# Patient Record
Sex: MALE | Race: WHITE | NOT HISPANIC OR LATINO | Employment: FULL TIME | RURAL
[De-identification: names, ages, dates, MRNs, and addresses within clinical notes are randomized per-mention and may not be internally consistent; named-entity substitution may affect disease eponyms.]

---

## 2021-12-01 ENCOUNTER — TELEPHONE (OUTPATIENT)
Dept: PRIMARY CARE CLINIC | Facility: CLINIC | Age: 66
End: 2021-12-01
Payer: MEDICARE

## 2021-12-01 RX ORDER — AMLODIPINE BESYLATE 5 MG/1
5 TABLET ORAL DAILY
Qty: 90 TABLET | Refills: 0 | Status: SHIPPED | OUTPATIENT
Start: 2021-12-01 | End: 2021-12-01

## 2021-12-01 RX ORDER — AMLODIPINE BESYLATE 5 MG/1
5 TABLET ORAL DAILY
COMMUNITY
End: 2021-12-01 | Stop reason: SDUPTHER

## 2021-12-01 RX ORDER — AMLODIPINE BESYLATE 5 MG/1
TABLET ORAL
Qty: 90 TABLET | Refills: 3 | Status: SHIPPED | OUTPATIENT
Start: 2021-12-01 | End: 2022-02-15 | Stop reason: SDUPTHER

## 2022-02-15 ENCOUNTER — OFFICE VISIT (OUTPATIENT)
Dept: PRIMARY CARE CLINIC | Facility: CLINIC | Age: 67
End: 2022-02-15
Payer: MEDICARE

## 2022-02-15 VITALS
DIASTOLIC BLOOD PRESSURE: 86 MMHG | WEIGHT: 236 LBS | RESPIRATION RATE: 16 BRPM | BODY MASS INDEX: 29.34 KG/M2 | HEART RATE: 61 BPM | SYSTOLIC BLOOD PRESSURE: 144 MMHG | OXYGEN SATURATION: 98 % | HEIGHT: 75 IN | TEMPERATURE: 98 F

## 2022-02-15 DIAGNOSIS — Z12.5 SCREENING FOR MALIGNANT NEOPLASM OF PROSTATE: ICD-10-CM

## 2022-02-15 DIAGNOSIS — I10 ESSENTIAL HYPERTENSION: Primary | ICD-10-CM

## 2022-02-15 DIAGNOSIS — Z12.11 SCREENING FOR MALIGNANT NEOPLASM OF COLON: ICD-10-CM

## 2022-02-15 PROCEDURE — 99204 OFFICE O/P NEW MOD 45 MIN: CPT | Mod: ,,, | Performed by: NURSE PRACTITIONER

## 2022-02-15 PROCEDURE — 99204 PR OFFICE/OUTPT VISIT, NEW, LEVL IV, 45-59 MIN: ICD-10-PCS | Mod: ,,, | Performed by: NURSE PRACTITIONER

## 2022-02-15 RX ORDER — AMLODIPINE BESYLATE 5 MG/1
TABLET ORAL
Qty: 90 TABLET | Refills: 3 | Status: SHIPPED | OUTPATIENT
Start: 2022-02-15 | End: 2022-10-06 | Stop reason: SDUPTHER

## 2022-02-16 PROBLEM — I10 ESSENTIAL HYPERTENSION: Status: ACTIVE | Noted: 2022-02-16

## 2022-02-16 NOTE — PROGRESS NOTES
Subjective:       Patient ID: Shahzad Alarcon is a 66 y.o. male.    Chief Complaint: Follow-up (1 year) and Medication Refill    HPI Shahzad Alarcon presents today for routine follow up and medication refill  Patient denies complaints or problems at present    Review of Systems   Constitutional: Negative for fatigue, fever and unexpected weight change.   HENT: Negative for nasal congestion, postnasal drip and sore throat.    Eyes: Negative for pain and redness.   Respiratory: Negative for cough, shortness of breath and wheezing.    Cardiovascular: Negative for chest pain and leg swelling.   Gastrointestinal: Negative for abdominal pain, constipation, diarrhea and nausea.   Genitourinary: Negative for dysuria and hematuria.   Musculoskeletal: Negative for gait problem.   Integumentary:  Negative for color change and rash.   Neurological: Negative for vertigo, syncope and headaches.         Objective:      Physical Exam  Constitutional:       General: He is not in acute distress.     Appearance: Normal appearance. He is not ill-appearing.   HENT:      Head: Normocephalic.   Eyes:      Conjunctiva/sclera: Conjunctivae normal.      Pupils: Pupils are equal, round, and reactive to light.   Cardiovascular:      Rate and Rhythm: Normal rate and regular rhythm.      Heart sounds: No murmur heard.      Pulmonary:      Effort: Pulmonary effort is normal. No respiratory distress.      Breath sounds: Normal breath sounds. No wheezing, rhonchi or rales.   Abdominal:      General: Bowel sounds are normal.      Palpations: Abdomen is soft.      Tenderness: There is no abdominal tenderness.   Musculoskeletal:         General: No swelling. Normal range of motion.      Cervical back: Neck supple. No rigidity or tenderness.      Right lower leg: No edema.      Left lower leg: No edema.   Skin:     General: Skin is warm and dry.   Neurological:      General: No focal deficit present.      Mental Status: He is alert and oriented to  person, place, and time.      Cranial Nerves: No cranial nerve deficit.         Assessment:       Problem List Items Addressed This Visit        Cardiac/Vascular    Essential hypertension - Primary    Relevant Medications    amLODIPine (NORVASC) 5 MG tablet    Other Relevant Orders    Lipid Panel (Completed)    Comprehensive Metabolic Panel (Completed)    CBC Auto Differential (Completed)      Other Visit Diagnoses     Screening for malignant neoplasm of prostate        Relevant Orders    PSA, Screening (Completed)    Screening for malignant neoplasm of colon        Relevant Orders    Cologuard Screening (Multitarget Stool DNA)          Plan:       Labs as ordered  Refills  Cologuard order  RTC 1 year, sooner as needed

## 2022-03-07 LAB — NONINV COLON CA DNA+OCC BLD SCRN STL QL: NEGATIVE

## 2022-10-06 DIAGNOSIS — I10 ESSENTIAL HYPERTENSION: ICD-10-CM

## 2022-10-06 RX ORDER — AMLODIPINE BESYLATE 5 MG/1
TABLET ORAL
Qty: 90 TABLET | Refills: 3 | Status: SHIPPED | OUTPATIENT
Start: 2022-10-06 | End: 2023-09-26 | Stop reason: SDUPTHER

## 2023-01-10 ENCOUNTER — OFFICE VISIT (OUTPATIENT)
Dept: OTOLARYNGOLOGY | Facility: CLINIC | Age: 68
End: 2023-01-10
Payer: MEDICARE

## 2023-01-10 VITALS — BODY MASS INDEX: 29.34 KG/M2 | HEIGHT: 75 IN | WEIGHT: 236 LBS

## 2023-01-10 DIAGNOSIS — H81.10 BENIGN PAROXYSMAL POSITIONAL VERTIGO, UNSPECIFIED LATERALITY: ICD-10-CM

## 2023-01-10 DIAGNOSIS — R42 VERTIGO: Primary | ICD-10-CM

## 2023-01-10 PROCEDURE — 99213 OFFICE O/P EST LOW 20 MIN: CPT | Mod: PBBFAC | Performed by: OTOLARYNGOLOGY

## 2023-01-10 PROCEDURE — 99204 OFFICE O/P NEW MOD 45 MIN: CPT | Mod: S$PBB,,, | Performed by: OTOLARYNGOLOGY

## 2023-01-10 PROCEDURE — 99204 PR OFFICE/OUTPT VISIT, NEW, LEVL IV, 45-59 MIN: ICD-10-PCS | Mod: S$PBB,,, | Performed by: OTOLARYNGOLOGY

## 2023-01-10 NOTE — PROGRESS NOTES
Subjective:       Patient ID: Shahzad Alarcon is a 67 y.o. male.    Chief Complaint: Dizziness (Vertigo.)    HPI  Review of Systems   Constitutional:  Negative for chills, fatigue and fever.   HENT:  Negative for nasal congestion, ear discharge, ear pain, sinus pressure/congestion and tinnitus.    Neurological:  Positive for dizziness and vertigo.       Objective:      Physical Exam  Constitutional:       Appearance: Normal appearance.   HENT:      Head: Normocephalic.      Right Ear: Tympanic membrane, ear canal and external ear normal.      Left Ear: Tympanic membrane, ear canal and external ear normal.      Nose: Nose normal.      Mouth/Throat:      Mouth: Mucous membranes are moist.      Pharynx: Oropharynx is clear.   Eyes:      General: Lids are normal.      Pupils: Pupils are equal, round, and reactive to light.   Pulmonary:      Effort: Pulmonary effort is normal.   Musculoskeletal:         General: Normal range of motion.      Cervical back: Normal range of motion.   Skin:     General: Skin is warm and dry.   Neurological:      Mental Status: He is alert and oriented to person, place, and time.      Motor: No weakness.      Coordination: Coordination normal.      Gait: Gait normal.   Psychiatric:         Mood and Affect: Mood normal.         Behavior: Behavior is cooperative.       Assessment:       Problem List Items Addressed This Visit    None  Visit Diagnoses       Vertigo    -  Primary    Benign paroxysmal positional vertigo, unspecified laterality                Plan:   [facility] for vestibular therapy  Follow up as needed.

## 2023-09-26 ENCOUNTER — OFFICE VISIT (OUTPATIENT)
Dept: PRIMARY CARE CLINIC | Facility: CLINIC | Age: 68
End: 2023-09-26
Payer: MEDICARE

## 2023-09-26 VITALS
DIASTOLIC BLOOD PRESSURE: 86 MMHG | TEMPERATURE: 98 F | OXYGEN SATURATION: 98 % | BODY MASS INDEX: 28.85 KG/M2 | SYSTOLIC BLOOD PRESSURE: 134 MMHG | WEIGHT: 232 LBS | HEART RATE: 82 BPM | HEIGHT: 75 IN

## 2023-09-26 DIAGNOSIS — Z11.59 NEED FOR HEPATITIS C SCREENING TEST: ICD-10-CM

## 2023-09-26 DIAGNOSIS — I10 ESSENTIAL HYPERTENSION: Primary | ICD-10-CM

## 2023-09-26 DIAGNOSIS — Z12.5 SCREENING FOR MALIGNANT NEOPLASM OF PROSTATE: ICD-10-CM

## 2023-09-26 PROCEDURE — 99214 OFFICE O/P EST MOD 30 MIN: CPT | Mod: ,,, | Performed by: NURSE PRACTITIONER

## 2023-09-26 PROCEDURE — 99214 PR OFFICE/OUTPT VISIT, EST, LEVL IV, 30-39 MIN: ICD-10-PCS | Mod: ,,, | Performed by: NURSE PRACTITIONER

## 2023-09-26 RX ORDER — AMLODIPINE BESYLATE 5 MG/1
5 TABLET ORAL DAILY
Qty: 90 TABLET | Refills: 3 | Status: SHIPPED | OUTPATIENT
Start: 2023-09-26

## 2023-09-26 NOTE — PROGRESS NOTES
Subjective     Patient ID: Shahzad Alarcon is a 67 y.o. male.    Chief Complaint: Establish Care    Pt presents to establish care.       Review of Systems   Constitutional:  Negative for activity change, appetite change, fatigue and fever.   HENT:  Negative for nasal congestion, nosebleeds, postnasal drip, rhinorrhea, sinus pressure/congestion, sneezing and sore throat.    Eyes:  Negative for pain and itching.   Respiratory:  Negative for cough, chest tightness, shortness of breath, wheezing and stridor.    Cardiovascular:  Negative for chest pain.   Gastrointestinal:  Negative for abdominal pain.   Genitourinary:  Negative for dysuria.   Musculoskeletal:  Negative for back pain.   Neurological:  Negative for dizziness and headaches.   Psychiatric/Behavioral:  Negative for behavioral problems and confusion.           Objective     Physical Exam  Vitals and nursing note reviewed.   Constitutional:       Appearance: Normal appearance.   Cardiovascular:      Rate and Rhythm: Normal rate and regular rhythm.      Heart sounds: Normal heart sounds.   Pulmonary:      Effort: Pulmonary effort is normal.      Breath sounds: Normal breath sounds.   Musculoskeletal:         General: Normal range of motion.   Neurological:      Mental Status: He is alert and oriented to person, place, and time.   Psychiatric:         Mood and Affect: Mood normal.         Behavior: Behavior normal.            Assessment and Plan     1. Essential hypertension  -     CBC Auto Differential; Future; Expected date: 09/26/2023  -     Comprehensive Metabolic Panel; Future; Expected date: 09/26/2023  -     Lipid Panel; Future; Expected date: 09/26/2023  -     TSH; Future; Expected date: 09/26/2023  -     amLODIPine (NORVASC) 5 MG tablet; Take 1 tablet (5 mg total) by mouth once daily.  Dispense: 90 tablet; Refill: 3    2. Screening for malignant neoplasm of prostate  -     PSA, Screening; Future; Expected date: 09/26/2023    3. Need for hepatitis C  screening test  -     Hepatitis C Antibody; Future; Expected date: 09/26/2023        Will call pt with lab results. Pt declined a pneumonia vaccine.          Follow up in about 6 months (around 3/26/2024).

## 2023-09-27 DIAGNOSIS — R97.20 ELEVATED PSA: Primary | ICD-10-CM

## 2023-12-07 ENCOUNTER — OFFICE VISIT (OUTPATIENT)
Dept: UROLOGY | Facility: CLINIC | Age: 68
End: 2023-12-07
Payer: MEDICARE

## 2023-12-07 VITALS
OXYGEN SATURATION: 97 % | BODY MASS INDEX: 30.16 KG/M2 | TEMPERATURE: 98 F | DIASTOLIC BLOOD PRESSURE: 88 MMHG | HEART RATE: 64 BPM | RESPIRATION RATE: 18 BRPM | WEIGHT: 235 LBS | SYSTOLIC BLOOD PRESSURE: 138 MMHG | HEIGHT: 74 IN

## 2023-12-07 DIAGNOSIS — R97.20 ELEVATED PSA: Primary | ICD-10-CM

## 2023-12-07 DIAGNOSIS — R35.1 NOCTURIA: ICD-10-CM

## 2023-12-07 PROCEDURE — 99215 OFFICE O/P EST HI 40 MIN: CPT | Mod: PBBFAC | Performed by: NURSE PRACTITIONER

## 2023-12-07 PROCEDURE — 99214 OFFICE O/P EST MOD 30 MIN: CPT | Mod: S$PBB,,, | Performed by: NURSE PRACTITIONER

## 2023-12-07 PROCEDURE — 87086 URINE CULTURE/COLONY COUNT: CPT | Mod: ,,, | Performed by: CLINICAL MEDICAL LABORATORY

## 2023-12-07 PROCEDURE — 99214 PR OFFICE/OUTPT VISIT, EST, LEVL IV, 30-39 MIN: ICD-10-PCS | Mod: S$PBB,,, | Performed by: NURSE PRACTITIONER

## 2023-12-07 PROCEDURE — 87086 CULTURE, URINE: ICD-10-PCS | Mod: ,,, | Performed by: CLINICAL MEDICAL LABORATORY

## 2023-12-07 RX ORDER — SULFAMETHOXAZOLE AND TRIMETHOPRIM 800; 160 MG/1; MG/1
TABLET ORAL
Qty: 60 TABLET | Refills: 0 | Status: SHIPPED | OUTPATIENT
Start: 2023-12-07

## 2023-12-07 NOTE — PATIENT INSTRUCTIONS
Urine culture   Rx Bactrim DS take one tablet twice a day, avoid direct sunlight, discussed side effects to include a rash  PSA in 4 months  IF PSA is back to normal then recheck the PSA in 6 months IF the PSA is still elevated then MRI of the prostate versus prostate biopsies in the clinic   Follow up with Urology [facility] Burch in 4 months or sooner if needed

## 2023-12-09 LAB — UA COMPLETE W REFLEX CULTURE PNL UR: NO GROWTH

## 2023-12-11 ENCOUNTER — TELEPHONE (OUTPATIENT)
Dept: UROLOGY | Facility: CLINIC | Age: 68
End: 2023-12-11
Payer: MEDICARE

## 2023-12-11 NOTE — TELEPHONE ENCOUNTER
----- Message from Valdez Berman NP sent at 12/10/2023  6:01 PM CST -----  Please let patient know his urine culture was negative, thanks   I called and spoke with the pt and relayed the above message.  He voiced understanding.

## 2024-04-03 ENCOUNTER — TELEPHONE (OUTPATIENT)
Dept: UROLOGY | Facility: CLINIC | Age: 69
End: 2024-04-03
Payer: MEDICARE

## 2024-04-03 NOTE — TELEPHONE ENCOUNTER
----- Message from Valdez Berman NP sent at 4/3/2024 11:57 AM CDT -----  Please notify patient that his PSA went down slightly to 6.580 and this is still elevated.  I will discuss further recommendations at his Visit on April 8, 2024 at 0800 am, remind him to keep this appointment, thanks   I called the pt and got no answer.  Left voice message that I called from urology with lab results and will call back later this evening after clinic.

## 2024-04-03 NOTE — TELEPHONE ENCOUNTER
----- Message from Valdez Berman NP sent at 4/3/2024 11:57 AM CDT -----  Please notify patient that his PSA went down slightly to 6.580 and this is still elevated.  I will discuss further recommendations at his Visit on April 8, 2024 at 0800 am, remind him to keep this appointment, thanks   Pt called me back and I informed him of the above message.  He voiced understanding and said he will be here on Monday.

## 2024-04-06 NOTE — PROGRESS NOTES
Subjective     Patient ID: Shahzad Alarcon is a 68 y.o. male.    Chief Complaint: No chief complaint on file.    This pleasant 67 year old male presents to the clinic with his wife as a new patient referral from [facility] for elevated PSA.  Patient's PSA on September 26, 2023 was elevated at 6.750. I discussed with the patient that he has had two elevated PSA's and the risk for prostate cancer. I discussed that we can treat with a month of antibiotics and repeat the PSA in  4 months or we could do the MRI of the prostate with possible fusion biopsies or we could do prostate biopsies in the clinic. He desires to do the antibiotics for a month and repeat the PSA in 4 months. He denies any allergies to sulfa and we will treat with Bactrim DS for a month as outlined in the plan. I discussed the side effects of this medication to include a rash. They were encouraged to read up on the side effects. He denies any family history of prostate cancer. He is pleased with the way he voids. His IPSS score is 3 that reflects a weak stream and nocturia 1 time a night. His PVR is 0 mls. He denies dysuria, hematuria, incomplete bladder emptying, frequency, intermittency, urgency or straining. He denies fever, chills, nausea or vomiting. He denies any abdominal, back or bladder pain. I discussed the plan in detail with the patient and wife and they are in agreement with the plan. All their questions were answered at today's visit.      PSA History:   PSA was 6.750 on 09/26/2023  PSA was 5.690 on 02/15/2022  -------------------------------------------------------------------------------------------------------------------------------- [December 7, 2023].            This pleasant 68 year old male presents to the clinic with his wife for follow up of elevated PSA.  Patient states he is doing well today.  Patient's PSA on September 26, 2023 was elevated at 6.750 and elevated at 5.690 on February 15, 2022.  He denies any family history  of prostate cancer. We treated with a month of Bactrim DS one bid.  However, the patient states he only received a two week supply of the bactrim.  His repeat PSA was still elevated at 6.580 on April 2, 2024. We discussed doing the MRI to further evaluate the prostate and they are agreeable. I will see him back int he clinic the day after the MRI to discuss the results and make further recommendations.  He is pleased with the way he voids. His IPSS score is a 1 that reflects nocturia 1 time a night. His PVR is 0 mls. He denies dysuria, hematuria, incomplete bladder emptying, frequency, intermittency, urgency weak stream or straining. He denies fever, chills, nausea or vomiting. He denies any abdominal, back or bladder pain. I discussed the plan in detail with the patient and wife and they are in agreement with the plan. All their questions were answered at today's visit.       PSA History:   PSA was 6.580 on 04/02/2024  PSA was 6.750 on 09/26/2023  PSA was 5.690 on 02/15/2022  ------------------------------------------------------------------------------------------------------------------------------ [April 8, 2024].           Review of Systems   Constitutional:  Negative for activity change and fever.   HENT:  Negative for hearing loss and trouble swallowing.    Eyes:  Negative for visual disturbance.   Respiratory:  Negative for cough, shortness of breath and wheezing.    Cardiovascular:  Negative for chest pain.   Gastrointestinal:  Negative for abdominal pain, diarrhea, nausea and vomiting.   Endocrine: Negative for polyuria.   Genitourinary:  Negative for bladder incontinence, decreased urine volume, difficulty urinating, discharge, dysuria, enuresis, erectile dysfunction, flank pain, frequency, genital sores, hematuria, penile pain, penile swelling, scrotal swelling, testicular pain and urgency.        Elevated PSA        Nocturia    Musculoskeletal:  Negative for back pain and gait problem.   Integumentary:   Negative for rash.   Neurological:  Negative for speech difficulty and weakness.   Psychiatric/Behavioral:  Negative for behavioral problems and confusion.           Objective     Physical Exam  Vitals and nursing note reviewed.   Constitutional:       General: He is not in acute distress.     Appearance: Normal appearance. He is not ill-appearing, toxic-appearing or diaphoretic.   HENT:      Head: Normocephalic.   Eyes:      Extraocular Movements: Extraocular movements intact.   Cardiovascular:      Rate and Rhythm: Normal rate and regular rhythm.      Heart sounds: Normal heart sounds.   Pulmonary:      Effort: Pulmonary effort is normal. No respiratory distress.      Breath sounds: Normal breath sounds. No wheezing, rhonchi or rales.   Abdominal:      General: Bowel sounds are normal.      Palpations: Abdomen is soft.      Tenderness: There is no abdominal tenderness. There is no right CVA tenderness, left CVA tenderness, guarding or rebound.   Musculoskeletal:         General: Normal range of motion.      Cervical back: Normal range of motion. No rigidity.   Skin:     General: Skin is warm and dry.   Neurological:      General: No focal deficit present.      Mental Status: He is alert and oriented to person, place, and time.      Motor: No weakness.      Coordination: Coordination normal.      Gait: Gait normal.   Psychiatric:         Mood and Affect: Mood normal.         Behavior: Behavior normal.         Thought Content: Thought content normal.          Assessment and Plan     Problem List Items Addressed This Visit          Renal/    Elevated PSA - Primary    Relevant Orders    Creatinine, Serum    MRI Prostate W W/O Contrast        MRI of the Prostate with and without contrast to evaluate elevated PSA's   Creatinine level   Follow up with Urology NP GAIL Burch day after MRI

## 2024-04-08 ENCOUNTER — OFFICE VISIT (OUTPATIENT)
Dept: UROLOGY | Facility: CLINIC | Age: 69
End: 2024-04-08
Payer: MEDICARE

## 2024-04-08 VITALS
TEMPERATURE: 98 F | RESPIRATION RATE: 18 BRPM | DIASTOLIC BLOOD PRESSURE: 86 MMHG | HEART RATE: 65 BPM | SYSTOLIC BLOOD PRESSURE: 169 MMHG

## 2024-04-08 DIAGNOSIS — R97.20 ELEVATED PSA: Primary | ICD-10-CM

## 2024-04-08 PROCEDURE — 99213 OFFICE O/P EST LOW 20 MIN: CPT | Mod: PBBFAC | Performed by: NURSE PRACTITIONER

## 2024-04-08 PROCEDURE — 99213 OFFICE O/P EST LOW 20 MIN: CPT | Mod: S$PBB,,, | Performed by: NURSE PRACTITIONER

## 2024-04-08 NOTE — PATIENT INSTRUCTIONS
MRI of the Prostate with and without contrast to evaluate elevated PSA's   Creatinine level   Follow up with Urology [facility] Burch day after MRI

## 2024-04-12 ENCOUNTER — OFFICE VISIT (OUTPATIENT)
Dept: PRIMARY CARE CLINIC | Facility: CLINIC | Age: 69
End: 2024-04-12
Payer: MEDICARE

## 2024-04-12 VITALS
DIASTOLIC BLOOD PRESSURE: 80 MMHG | HEIGHT: 75 IN | RESPIRATION RATE: 16 BRPM | HEART RATE: 80 BPM | WEIGHT: 230 LBS | TEMPERATURE: 98 F | OXYGEN SATURATION: 98 % | SYSTOLIC BLOOD PRESSURE: 132 MMHG | BODY MASS INDEX: 28.6 KG/M2

## 2024-04-12 DIAGNOSIS — J32.9 SINUSITIS, UNSPECIFIED CHRONICITY, UNSPECIFIED LOCATION: Primary | ICD-10-CM

## 2024-04-12 DIAGNOSIS — H65.191 ACUTE EFFUSION OF RIGHT EAR: ICD-10-CM

## 2024-04-12 PROCEDURE — 99213 OFFICE O/P EST LOW 20 MIN: CPT | Mod: ,,, | Performed by: NURSE PRACTITIONER

## 2024-04-12 RX ORDER — METHYLPREDNISOLONE 4 MG/1
TABLET ORAL
Qty: 21 EACH | Refills: 0 | Status: SHIPPED | OUTPATIENT
Start: 2024-04-12 | End: 2024-05-03

## 2024-04-12 RX ORDER — DOXYCYCLINE HYCLATE 100 MG
100 TABLET ORAL 2 TIMES DAILY
Qty: 10 TABLET | Refills: 0 | Status: SHIPPED | OUTPATIENT
Start: 2024-04-12 | End: 2024-04-17

## 2024-04-12 NOTE — PROGRESS NOTES
Pt is a 67 y/o WM here for congestion, feeling of ears stopped up x 9 days.    Past Medical History:   Diagnosis Date    Hypertension      Patient Active Problem List   Diagnosis    Essential hypertension    Elevated PSA    Nocturia     Review of Systems   Constitutional:  Negative for chills and fever.   HENT:  Positive for congestion and sinus pain. Negative for ear discharge and sore throat.    Respiratory:  Positive for cough. Negative for shortness of breath.    Cardiovascular:  Negative for chest pain.   Gastrointestinal:  Negative for abdominal pain, blood in stool, constipation, diarrhea, melena, nausea and vomiting.   Skin:  Negative for rash.   Neurological:  Negative for weakness.     Physical Exam  Vitals reviewed. Exam conducted with a chaperone present.   Constitutional:       General: He is not in acute distress.     Appearance: Normal appearance.   HENT:      Head: Normocephalic.      Right Ear: A middle ear effusion is present. Tympanic membrane is erythematous.      Left Ear: Tympanic membrane normal.      Ears:      Comments: Left ear: ear wax  Eyes:      General: No scleral icterus.     Pupils: Pupils are equal, round, and reactive to light.   Cardiovascular:      Rate and Rhythm: Normal rate.      Pulses: Normal pulses.   Pulmonary:      Effort: Pulmonary effort is normal. No respiratory distress.      Breath sounds: Normal breath sounds.   Abdominal:      General: Abdomen is flat. There is no distension.      Palpations: Abdomen is soft.      Tenderness: There is no abdominal tenderness. There is no guarding or rebound.   Musculoskeletal:         General: No swelling.   Skin:     General: Skin is warm and dry.      Coloration: Skin is not jaundiced.   Neurological:      General: No focal deficit present.      Mental Status: He is alert and oriented to person, place, and time.   Psychiatric:         Mood and Affect: Mood normal.         Behavior: Behavior normal.         Thought Content: Thought  content normal.         Judgment: Judgment normal.       Plan  Sinusitis, unspecified chronicity, unspecified location  -     doxycycline (VIBRA-TABS) 100 MG tablet; Take 1 tablet (100 mg total) by mouth 2 (two) times daily. for 5 days  Dispense: 10 tablet; Refill: 0  -     methylPREDNISolone (MEDROL DOSEPACK) 4 mg tablet; use as directed  Dispense: 21 each; Refill: 0    Acute effusion of right ear  -     doxycycline (VIBRA-TABS) 100 MG tablet; Take 1 tablet (100 mg total) by mouth 2 (two) times daily. for 5 days  Dispense: 10 tablet; Refill: 0  -     methylPREDNISolone (MEDROL DOSEPACK) 4 mg tablet; use as directed  Dispense: 21 each; Refill: 0    H202 or debrox drops for ear wax    Follow up if symptoms worsen or fail to improve.

## 2024-04-22 NOTE — PROGRESS NOTES
Subjective     Patient ID: Shahzad Alarcon is a 68 y.o. male.    Chief Complaint: No chief complaint on file.    This pleasant 67 year old male presents to the clinic with his wife as a new patient referral from [facility] for elevated PSA.  Patient's PSA on September 26, 2023 was elevated at 6.750. I discussed with the patient that he has had two elevated PSA's and the risk for prostate cancer. I discussed that we can treat with a month of antibiotics and repeat the PSA in  4 months or we could do the MRI of the prostate with possible fusion biopsies or we could do prostate biopsies in the clinic. He desires to do the antibiotics for a month and repeat the PSA in 4 months. He denies any allergies to sulfa and we will treat with Bactrim DS for a month as outlined in the plan. I discussed the side effects of this medication to include a rash. They were encouraged to read up on the side effects. He denies any family history of prostate cancer. He is pleased with the way he voids. His IPSS score is 3 that reflects a weak stream and nocturia 1 time a night. His PVR is 0 mls. He denies dysuria, hematuria, incomplete bladder emptying, frequency, intermittency, urgency or straining. He denies fever, chills, nausea or vomiting. He denies any abdominal, back or bladder pain. I discussed the plan in detail with the patient and wife and they are in agreement with the plan. All their questions were answered at today's visit.      PSA History:   PSA was 6.750 on 09/26/2023  PSA was 5.690 on 02/15/2022  -------------------------------------------------------------------------------------------------------------------------------- [December 7, 2023].                 This pleasant 68 year old male presents to the clinic with his wife for follow up of elevated PSA.  Patient states he is doing well today.  Patient's PSA on September 26, 2023 was elevated at 6.750 and elevated at 5.690 on February 15, 2022.  He denies any family  history of prostate cancer. We treated with a month of Bactrim DS one bid.  However, the patient states he only received a two week supply of the bactrim.  His repeat PSA was still elevated at 6.580 on April 2, 2024. We discussed doing the MRI to further evaluate the prostate and they are agreeable. I will see him back int he clinic the day after the MRI to discuss the results and make further recommendations.  He is pleased with the way he voids. His IPSS score is a 1 that reflects nocturia 1 time a night. His PVR is 0 mls. He denies dysuria, hematuria, incomplete bladder emptying, frequency, intermittency, urgency weak stream or straining. He denies fever, chills, nausea or vomiting. He denies any abdominal, back or bladder pain. I discussed the plan in detail with the patient and wife and they are in agreement with the plan. All their questions were answered at today's visit.         PSA History:   PSA was 6.580 on 04/02/2024  PSA was 6.750 on 09/26/2023  PSA was 5.690 on 02/15/2022  ------------------------------------------------------------------------------------------------------------------------------ [April 8, 2024].             This pleasant 68 year old male presents to the clinic with his wife for follow up of MRI results and elevated PSA.  Patient is doing good today.  His MRI of the Prostate with and without contrast done on April 26, 2024 showed a PI-RADS version 2.1 score: 4 High (clinically significant cancer is likely to be present) . His Prostate size: 5.2 x 4.4 x 4.4 cm.  There is a 13 x 8 x 6 mm category 4 signal abnormality in the right peripheral zone posteriorly and laterally at the mid to apex prostate level.  I reviewed and discussed the MRI results with Dr. Suarez, the patient and wife.  Dr. Suarez recommended prostate fusion biopsies and they are agreeable.  This will be scheduled for May 1, 2024.  Surgery instructions were given to the patient and wife by Dr. Suarez's nurse HUSSEIN Cervantes.  He  will get the lab work and EKG today on the way out.  I will culture his urine and treat if indicated. Patient's PSA on September 26, 2023 was elevated at 6.750 and elevated at 5.690 on February 15, 2022.  His repeat PSA was still elevated at 6.580 on April 2, 2024. He denies any family history of prostate cancer.  He denies any blood thinners.     He is pleased with the way he voids. His IPSS score is a 1 that reflects nocturia 1 time a night. His PVR is 0 mls. He denies dysuria, hematuria, incomplete bladder emptying, frequency, intermittency, urgency weak stream or straining. He denies fever, chills, nausea or vomiting. He denies any abdominal, back or bladder pain. I discussed the plan in detail with Dr. Suarez, the patient and wife and they are in agreement with the plan. All their questions were answered at today's visit.      MRI PROSTATE W W/O CONTRAST done on April 26, 2024.  CLINICAL HISTORY:  Elevated PSA level   TECHNIQUE:  1.5 Gena MRI with T1, T2, high b-value DWI and ADC sequences, and dynamic axial T1 postcontrast sequences.  20 ml MultiHance contrast was administered intravenously.  At the time of the initial dictation, the CAD software is not available.  Abnormalities could therefore not be marked for fusion biopsy at this time.  COMPARISON:  No previous prostate MRI available  FINDINGS:   Prostate size: 5.2 x 4.4 x 4.4 cm maximum dimensions  Transition zone: T2 assessment: 2/5.  DWI assessment: 2/5.  DCE assessment: Negative  Peripheral zone: T2 assessment: 4/5.  DWI assessment: 3/5.  DCE assessment: Positive  There is a 13 by 8 by 6 mm T2 hypointense signal abnormality with markedly hypointense ADC signal and mildly hyperintense DWI signal with postcontrast enhancement in the posterolateral aspect of the right peripheral zone at the mid to apex prostate level compatible with category 4 signal abnormality.  Seminal vesicles: No signal intensity focus, restricted diffusion, or early enhancement is  seen in the seminal vesicles Extracapsular extension: Fat planes along the pseudocapsule of the prostate and neurovascular bundles are seemingly well-preserved  Pelvis: No lymphadenopathy is seen by short axis diameter criteria. No aggressive bony lesions are identified.  There is diverticulosis of the colon without jenn diverticulitis.   Impression:  High level of suspicion for clinically significant prostate cancer.  There is a 13 x 8 x 6 mm category 4 signal abnormality in the right peripheral zone posteriorly and laterally at the mid to apex prostate level.  At the time of this dictation, the CAD/fusion software could not be accessed, and therefore this has not yet been marked for fusion biopsy.   PI-RADS version 2.1 score: 4.   Electronically signed by:Sergio Ji    PSA History:   PSA was 6.580 on 04/02/2024  PSA was 6.750 on 09/26/2023  PSA was 5.690 on 02/15/2022   ---------------------------------------------------------------------------------------------------------------------  [April 26, 2024].               Review of Systems   Constitutional:  Negative for activity change and fever.   HENT:  Negative for hearing loss and trouble swallowing.    Eyes:  Negative for visual disturbance.   Respiratory:  Negative for cough, shortness of breath and wheezing.    Cardiovascular:  Negative for chest pain.   Gastrointestinal:  Negative for abdominal pain, diarrhea, nausea and vomiting.   Endocrine: Negative for polyuria.   Genitourinary:  Negative for bladder incontinence, decreased urine volume, difficulty urinating, discharge, dysuria, enuresis, erectile dysfunction, flank pain, frequency, genital sores, hematuria, penile pain, penile swelling, scrotal swelling, testicular pain and urgency.        Elevated PSA        Nocturia    Musculoskeletal:  Negative for back pain and gait problem.   Integumentary:  Negative for rash.   Neurological:  Negative for speech difficulty and weakness.    Psychiatric/Behavioral:  Negative for behavioral problems and confusion.           Objective     Physical Exam  Vitals and nursing note reviewed.   Constitutional:       General: He is not in acute distress.     Appearance: Normal appearance. He is not ill-appearing, toxic-appearing or diaphoretic.   HENT:      Head: Normocephalic.   Eyes:      Extraocular Movements: Extraocular movements intact.   Cardiovascular:      Rate and Rhythm: Normal rate and regular rhythm.      Heart sounds: Normal heart sounds.   Pulmonary:      Effort: Pulmonary effort is normal. No respiratory distress.      Breath sounds: Normal breath sounds. No wheezing, rhonchi or rales.   Abdominal:      General: Bowel sounds are normal.      Palpations: Abdomen is soft.      Tenderness: There is no abdominal tenderness. There is no right CVA tenderness, left CVA tenderness, guarding or rebound.   Musculoskeletal:         General: Normal range of motion.      Cervical back: Normal range of motion. No rigidity.   Skin:     General: Skin is warm and dry.   Neurological:      General: No focal deficit present.      Mental Status: He is alert and oriented to person, place, and time.      Motor: No weakness.      Coordination: Coordination normal.      Gait: Gait normal.   Psychiatric:         Mood and Affect: Mood normal.         Behavior: Behavior normal.         Thought Content: Thought content normal.          Assessment and Plan     Problem List Items Addressed This Visit          Renal/    Elevated PSA - Primary    Nocturia    Relevant Orders    Urine culture       Other    Pre-op testing    Relevant Orders    EKG 12-lead    Basic Metabolic Panel    CBC Auto Differential        Urine culture   Schedule Prostate Fusion biopsies with Dr. Suarez on May 1, 2024  Surgery instructions given by Dr. Suarez's nurse HUSSEIN Cervantes   EKG   BMP, CBC  Will call Pathology Report when ready and make further recommendations at that time

## 2024-04-26 ENCOUNTER — OFFICE VISIT (OUTPATIENT)
Dept: UROLOGY | Facility: CLINIC | Age: 69
End: 2024-04-26
Payer: MEDICARE

## 2024-04-26 ENCOUNTER — CLINICAL SUPPORT (OUTPATIENT)
Dept: CARDIOLOGY | Facility: CLINIC | Age: 69
End: 2024-04-26
Payer: MEDICARE

## 2024-04-26 VITALS
WEIGHT: 226 LBS | TEMPERATURE: 98 F | HEIGHT: 75 IN | HEART RATE: 64 BPM | OXYGEN SATURATION: 96 % | BODY MASS INDEX: 28.1 KG/M2 | DIASTOLIC BLOOD PRESSURE: 75 MMHG | SYSTOLIC BLOOD PRESSURE: 138 MMHG

## 2024-04-26 DIAGNOSIS — R97.20 ELEVATED PSA: Primary | ICD-10-CM

## 2024-04-26 DIAGNOSIS — R35.1 NOCTURIA: ICD-10-CM

## 2024-04-26 DIAGNOSIS — Z01.818 PRE-OP TESTING: ICD-10-CM

## 2024-04-26 PROCEDURE — 99212 OFFICE O/P EST SF 10 MIN: CPT | Mod: PBBFAC,25

## 2024-04-26 PROCEDURE — 93005 ELECTROCARDIOGRAM TRACING: CPT | Mod: PBBFAC | Performed by: INTERNAL MEDICINE

## 2024-04-26 PROCEDURE — 87086 URINE CULTURE/COLONY COUNT: CPT | Mod: ,,, | Performed by: CLINICAL MEDICAL LABORATORY

## 2024-04-26 PROCEDURE — 93010 ELECTROCARDIOGRAM REPORT: CPT | Mod: S$PBB,,, | Performed by: INTERNAL MEDICINE

## 2024-04-26 PROCEDURE — 99214 OFFICE O/P EST MOD 30 MIN: CPT | Mod: PBBFAC,25 | Performed by: NURSE PRACTITIONER

## 2024-04-26 PROCEDURE — 99213 OFFICE O/P EST LOW 20 MIN: CPT | Mod: S$PBB,,, | Performed by: NURSE PRACTITIONER

## 2024-04-26 NOTE — PATIENT INSTRUCTIONS
Urine culture   Schedule Prostate Fusion biopsies with Dr. Suarez on May 1, 2024  Surgery instructions given by Dr. Suarez's nurse HUSSEIN Cervantes   EKG   BMP, CBC  Will call Pathology Report when ready and make further recommendations at that time

## 2024-04-27 LAB
OHS QRS DURATION: 96 MS
OHS QTC CALCULATION: 408 MS

## 2024-04-28 LAB — UA COMPLETE W REFLEX CULTURE PNL UR: NORMAL

## 2024-04-29 ENCOUNTER — TELEPHONE (OUTPATIENT)
Dept: UROLOGY | Facility: CLINIC | Age: 69
End: 2024-04-29
Payer: MEDICARE

## 2024-04-29 NOTE — TELEPHONE ENCOUNTER
----- Message from Valdez Berman NP sent at 4/29/2024  8:19 AM CDT -----  Please notify patient his urine culture was negative, thanks   I called the pt and got no answer.  It went to his voicemail identified with his name and voice.  I left him a voice message that I am calling from urology clinic with culture results, and his urine did not grow out any bacteria, so he does not have a UTI at this time.  If he has any questions, please call urology back.

## 2024-04-30 DIAGNOSIS — R97.20 ELEVATED PSA: Primary | ICD-10-CM

## 2024-04-30 RX ORDER — CEFAZOLIN SODIUM 2 G/50ML
2 SOLUTION INTRAVENOUS ONCE
Status: CANCELLED | OUTPATIENT
Start: 2024-05-01

## 2024-04-30 RX ORDER — GENTAMICIN SULFATE 80 MG/100ML
80 INJECTION, SOLUTION INTRAVENOUS ONCE
Status: CANCELLED | OUTPATIENT
Start: 2024-05-01

## 2024-05-01 ENCOUNTER — HOSPITAL ENCOUNTER (OUTPATIENT)
Facility: HOSPITAL | Age: 69
Discharge: HOME | End: 2024-05-01
Attending: UROLOGY | Admitting: UROLOGY
Payer: MEDICARE

## 2024-05-01 ENCOUNTER — ANESTHESIA EVENT (OUTPATIENT)
Dept: SURGERY | Facility: HOSPITAL | Age: 69
End: 2024-05-01
Payer: MEDICARE

## 2024-05-01 ENCOUNTER — ANESTHESIA (OUTPATIENT)
Dept: SURGERY | Facility: HOSPITAL | Age: 69
End: 2024-05-01
Payer: MEDICARE

## 2024-05-01 VITALS
SYSTOLIC BLOOD PRESSURE: 99 MMHG | DIASTOLIC BLOOD PRESSURE: 66 MMHG | OXYGEN SATURATION: 98 % | RESPIRATION RATE: 12 BRPM | HEART RATE: 74 BPM

## 2024-05-01 VITALS
TEMPERATURE: 98 F | RESPIRATION RATE: 10 BRPM | OXYGEN SATURATION: 99 % | DIASTOLIC BLOOD PRESSURE: 70 MMHG | SYSTOLIC BLOOD PRESSURE: 127 MMHG | HEART RATE: 53 BPM

## 2024-05-01 DIAGNOSIS — R97.20 ELEVATED PSA: Primary | ICD-10-CM

## 2024-05-01 PROCEDURE — 36000704 HC OR TIME LEV I 1ST 15 MIN: Performed by: UROLOGY

## 2024-05-01 PROCEDURE — 55700 PR BIOPSY OF PROSTATE,NEEDLE/PUNCH: CPT | Mod: ,,, | Performed by: UROLOGY

## 2024-05-01 PROCEDURE — 27000177 HC AIRWAY, LARYNGEAL MASK: Performed by: NURSE ANESTHETIST, CERTIFIED REGISTERED

## 2024-05-01 PROCEDURE — 27000510 HC BLANKET BAIR HUGGER ANY SIZE: Performed by: NURSE ANESTHETIST, CERTIFIED REGISTERED

## 2024-05-01 PROCEDURE — G0416 PROSTATE BIOPSY, ANY MTHD: HCPCS | Mod: 26,,, | Performed by: PATHOLOGY

## 2024-05-01 PROCEDURE — 36000705 HC OR TIME LEV I EA ADD 15 MIN: Performed by: UROLOGY

## 2024-05-01 PROCEDURE — 27000716 HC OXISENSOR PROBE, ANY SIZE: Performed by: NURSE ANESTHETIST, CERTIFIED REGISTERED

## 2024-05-01 PROCEDURE — G0416 PROSTATE BIOPSY, ANY MTHD: HCPCS | Mod: TC,SUR | Performed by: UROLOGY

## 2024-05-01 PROCEDURE — 25000003 PHARM REV CODE 250: Performed by: UROLOGY

## 2024-05-01 PROCEDURE — 63600175 PHARM REV CODE 636 W HCPCS: Performed by: UROLOGY

## 2024-05-01 PROCEDURE — 63600175 PHARM REV CODE 636 W HCPCS: Performed by: NURSE ANESTHETIST, CERTIFIED REGISTERED

## 2024-05-01 PROCEDURE — D9220A PRA ANESTHESIA: Mod: CRNA,,, | Performed by: NURSE ANESTHETIST, CERTIFIED REGISTERED

## 2024-05-01 PROCEDURE — 76942 ECHO GUIDE FOR BIOPSY: CPT | Mod: 26,,, | Performed by: UROLOGY

## 2024-05-01 PROCEDURE — 25000003 PHARM REV CODE 250: Performed by: NURSE ANESTHETIST, CERTIFIED REGISTERED

## 2024-05-01 PROCEDURE — 37000009 HC ANESTHESIA EA ADD 15 MINS: Performed by: UROLOGY

## 2024-05-01 PROCEDURE — 71000033 HC RECOVERY, INTIAL HOUR: Performed by: UROLOGY

## 2024-05-01 PROCEDURE — 71000015 HC POSTOP RECOV 1ST HR: Performed by: UROLOGY

## 2024-05-01 PROCEDURE — 37000008 HC ANESTHESIA 1ST 15 MINUTES: Performed by: UROLOGY

## 2024-05-01 PROCEDURE — D9220A PRA ANESTHESIA: Mod: ANES,,, | Performed by: ANESTHESIOLOGY

## 2024-05-01 PROCEDURE — 88342 IMHCHEM/IMCYTCHM 1ST ANTB: CPT | Mod: 26,,, | Performed by: PATHOLOGY

## 2024-05-01 PROCEDURE — 71000016 HC POSTOP RECOV ADDL HR: Performed by: UROLOGY

## 2024-05-01 PROCEDURE — 88342 IMHCHEM/IMCYTCHM 1ST ANTB: CPT | Mod: TC,91,SUR | Performed by: UROLOGY

## 2024-05-01 RX ORDER — ONDANSETRON 4 MG/1
8 TABLET, ORALLY DISINTEGRATING ORAL EVERY 8 HOURS PRN
Status: CANCELLED | OUTPATIENT
Start: 2024-05-01

## 2024-05-01 RX ORDER — ONDANSETRON HYDROCHLORIDE 2 MG/ML
INJECTION, SOLUTION INTRAVENOUS
Status: DISCONTINUED | OUTPATIENT
Start: 2024-05-01 | End: 2024-05-01

## 2024-05-01 RX ORDER — GENTAMICIN SULFATE 80 MG/100ML
80 INJECTION, SOLUTION INTRAVENOUS ONCE
Status: COMPLETED | OUTPATIENT
Start: 2024-05-01 | End: 2024-05-01

## 2024-05-01 RX ORDER — FENTANYL CITRATE 50 UG/ML
INJECTION, SOLUTION INTRAMUSCULAR; INTRAVENOUS
Status: DISCONTINUED | OUTPATIENT
Start: 2024-05-01 | End: 2024-05-01

## 2024-05-01 RX ORDER — PHENYLEPHRINE HYDROCHLORIDE 10 MG/ML
INJECTION INTRAVENOUS
Status: DISCONTINUED | OUTPATIENT
Start: 2024-05-01 | End: 2024-05-01

## 2024-05-01 RX ORDER — MIDAZOLAM HYDROCHLORIDE 1 MG/ML
INJECTION INTRAMUSCULAR; INTRAVENOUS
Status: DISCONTINUED | OUTPATIENT
Start: 2024-05-01 | End: 2024-05-01

## 2024-05-01 RX ORDER — LIDOCAINE HYDROCHLORIDE 20 MG/ML
INJECTION, SOLUTION EPIDURAL; INFILTRATION; INTRACAUDAL; PERINEURAL
Status: DISCONTINUED | OUTPATIENT
Start: 2024-05-01 | End: 2024-05-01

## 2024-05-01 RX ORDER — DIPHENHYDRAMINE HYDROCHLORIDE 50 MG/ML
25 INJECTION INTRAMUSCULAR; INTRAVENOUS EVERY 6 HOURS PRN
Status: DISCONTINUED | OUTPATIENT
Start: 2024-05-01 | End: 2024-05-01 | Stop reason: HOSPADM

## 2024-05-01 RX ORDER — PROPOFOL 10 MG/ML
VIAL (ML) INTRAVENOUS
Status: DISCONTINUED | OUTPATIENT
Start: 2024-05-01 | End: 2024-05-01

## 2024-05-01 RX ORDER — MEPERIDINE HYDROCHLORIDE 25 MG/ML
25 INJECTION INTRAMUSCULAR; INTRAVENOUS; SUBCUTANEOUS EVERY 10 MIN PRN
Status: DISCONTINUED | OUTPATIENT
Start: 2024-05-01 | End: 2024-05-01 | Stop reason: HOSPADM

## 2024-05-01 RX ORDER — GLYCOPYRROLATE 0.2 MG/ML
INJECTION INTRAMUSCULAR; INTRAVENOUS
Status: DISCONTINUED | OUTPATIENT
Start: 2024-05-01 | End: 2024-05-01

## 2024-05-01 RX ORDER — HYDROMORPHONE HYDROCHLORIDE 2 MG/ML
0.5 INJECTION, SOLUTION INTRAMUSCULAR; INTRAVENOUS; SUBCUTANEOUS EVERY 5 MIN PRN
Status: DISCONTINUED | OUTPATIENT
Start: 2024-05-01 | End: 2024-05-01 | Stop reason: HOSPADM

## 2024-05-01 RX ORDER — CEFAZOLIN SODIUM 1 G/3ML
INJECTION, POWDER, FOR SOLUTION INTRAMUSCULAR; INTRAVENOUS
Status: DISCONTINUED | OUTPATIENT
Start: 2024-05-01 | End: 2024-05-01

## 2024-05-01 RX ORDER — HYDROCODONE BITARTRATE AND ACETAMINOPHEN 5; 325 MG/1; MG/1
1 TABLET ORAL EVERY 4 HOURS PRN
Status: CANCELLED | OUTPATIENT
Start: 2024-05-01

## 2024-05-01 RX ORDER — ONDANSETRON HYDROCHLORIDE 2 MG/ML
4 INJECTION, SOLUTION INTRAVENOUS DAILY PRN
Status: DISCONTINUED | OUTPATIENT
Start: 2024-05-01 | End: 2024-05-01 | Stop reason: HOSPADM

## 2024-05-01 RX ORDER — SODIUM CHLORIDE 9 MG/ML
INJECTION, SOLUTION INTRAVENOUS CONTINUOUS
Status: DISCONTINUED | OUTPATIENT
Start: 2024-05-01 | End: 2024-05-01 | Stop reason: HOSPADM

## 2024-05-01 RX ORDER — MORPHINE SULFATE 10 MG/ML
4 INJECTION INTRAMUSCULAR; INTRAVENOUS; SUBCUTANEOUS EVERY 5 MIN PRN
Status: DISCONTINUED | OUTPATIENT
Start: 2024-05-01 | End: 2024-05-01 | Stop reason: HOSPADM

## 2024-05-01 RX ADMIN — PROPOFOL 200 MG: 10 INJECTION, EMULSION INTRAVENOUS at 11:05

## 2024-05-01 RX ADMIN — GLYCOPYRROLATE 0.2 MG: 0.2 INJECTION INTRAMUSCULAR; INTRAVENOUS at 11:05

## 2024-05-01 RX ADMIN — FENTANYL CITRATE 50 MCG: 50 INJECTION INTRAMUSCULAR; INTRAVENOUS at 11:05

## 2024-05-01 RX ADMIN — PHENYLEPHRINE HYDROCHLORIDE 100 MCG: 10 INJECTION INTRAVENOUS at 11:05

## 2024-05-01 RX ADMIN — CEFAZOLIN 2 G: 1 INJECTION, POWDER, FOR SOLUTION INTRAMUSCULAR; INTRAVENOUS; PARENTERAL at 11:05

## 2024-05-01 RX ADMIN — ONDANSETRON 4 MG: 2 INJECTION INTRAMUSCULAR; INTRAVENOUS at 11:05

## 2024-05-01 RX ADMIN — MIDAZOLAM HYDROCHLORIDE 2 MG: 1 INJECTION, SOLUTION INTRAMUSCULAR; INTRAVENOUS at 11:05

## 2024-05-01 RX ADMIN — SODIUM CHLORIDE: 9 INJECTION, SOLUTION INTRAVENOUS at 09:05

## 2024-05-01 RX ADMIN — GENTAMICIN SULFATE 100 ML: 80 INJECTION, SOLUTION INTRAVENOUS at 11:05

## 2024-05-01 RX ADMIN — SODIUM CHLORIDE: 9 INJECTION, SOLUTION INTRAVENOUS at 11:05

## 2024-05-01 RX ADMIN — LIDOCAINE HYDROCHLORIDE 70 MG: 20 INJECTION, SOLUTION INTRAVENOUS at 11:05

## 2024-05-01 NOTE — TRANSFER OF CARE
Anesthesia Transfer of Care Note    Patient: Shahzad Alarcon    Procedure(s) Performed: Procedure(s) (LRB):  BIOPSY, PROSTATE, USING PROSTATE MAPPING (Bilateral)    Patient location: PACU    Anesthesia Type: general    Transport from OR: Transported from OR on 6-10 L/min O2 by face mask with adequate spontaneous ventilation    Post pain: adequate analgesia    Post assessment: no apparent anesthetic complications    Post vital signs: stable    Level of consciousness: awake, alert and oriented    Nausea/Vomiting: no nausea/vomiting    Complications: none    Transfer of care protocol was followedComments: Good SV continue, NAD noted, VSS, RTRN      Last vitals: Visit Vitals  BP (!) 119/51 (BP Location: Right arm, Patient Position: Lying)   Pulse 76   Temp 36.5 °C (97.7 °F) (Oral)   Resp 10   SpO2 98%

## 2024-05-01 NOTE — ANESTHESIA PREPROCEDURE EVALUATION
05/01/2024  Shahzad Alarcon is a 68 y.o., male.      Pre-op Assessment    I have reviewed the Patient Summary Reports.    I have reviewed the NPO Status.   I have reviewed the Medications.     Review of Systems         Anesthesia Plan  Type of Anesthesia, risks & benefits discussed:    Anesthesia Type: Gen ETT  Intra-op Monitoring Plan: Standard ASA Monitors  Induction:  IV  Informed Consent: Informed consent signed with the Patient and all parties understand the risks and agree with anesthesia plan.  All questions answered.   ASA Score: 2    Ready For Surgery From Anesthesia Perspective.     .  NPO greater than 8 hours  No anesthetic complications  Allergic to PCN    Hct 44  4/26/24 EKG: Sinus bradycardia with sinus arrhythmia with occasional Premature ventricular complexes; 58 bpm     HTN    MP II; adequate ROM at neck

## 2024-05-01 NOTE — ANESTHESIA POSTPROCEDURE EVALUATION
Anesthesia Post Evaluation    Patient: Shahzad Alarcon    Procedure(s) Performed: Procedure(s) (LRB):  BIOPSY, PROSTATE, USING PROSTATE MAPPING (Bilateral)    Final Anesthesia Type: general      Patient location during evaluation: PACU  Post-procedure vital signs: reviewed and stable  Pain management: adequate  Airway patency: patent    PONV status at discharge: No PONV  Anesthetic complications: no      Cardiovascular status: hemodynamically stable  Respiratory status: unassisted  Hydration status: euvolemic  Follow-up not needed.              Vitals Value Taken Time   /70 05/01/24 1316   Temp 36.5 °C (97.7 °F) 05/01/24 1153   Pulse 48 05/01/24 1329   Resp 10 05/01/24 1220   SpO2 99 % 05/01/24 1329   Vitals shown include unfiled device data.      Event Time   Out of Recovery 12:20:00         Pain/Stewart Score: Stewart Score: 10 (5/1/2024 12:25 PM)

## 2024-05-01 NOTE — DISCHARGE SUMMARY
This pleasant 68 year old male presents to the clinic with his wife for follow up of MRI results and elevated PSA.  Patient is doing good today.  His MRI of the Prostate with and without contrast done on April 26, 2024 showed a PI-RADS version 2.1 score: 4 High (clinically significant cancer is likely to be present) . His Prostate size: 5.2 x 4.4 x 4.4 cm.  There is a 13 x 8 x 6 mm category 4 signal abnormality in the right peripheral zone posteriorly and laterally at the mid to apex prostate level.  I reviewed and discussed the MRI results with Dr. Suarez, the patient and wife.  Dr. Suarez recommended prostate fusion biopsies and they are agreeable.  This will be scheduled for May 1, 2024.  Surgery instructions were given to the patient and wife by Dr. Suarez's nurse HUSSEIN Cervantes.  He will get the lab work and EKG today on the way out.  I will culture his urine and treat if indicated. Patient's PSA on September 26, 2023 was elevated at 6.750 and elevated at 5.690 on February 15, 2022.  His repeat PSA was still elevated at 6.580 on April 2, 2024. He denies any family history of prostate cancer.  He denies any blood thinners.      He is pleased with the way he voids. His IPSS score is a 1 that reflects nocturia 1 time a night. His PVR is 0 mls. He denies dysuria, hematuria, incomplete bladder emptying, frequency, intermittency, urgency weak stream or straining. He denies fever, chills, nausea or vomiting. He denies any abdominal, back or bladder pain. I discussed the plan in detail with Dr. Suarez, the patient and wife and they are in agreement with the plan. All their questions were answered at today's visit.      MRI PROSTATE W W/O CONTRAST done on April 26, 2024.  CLINICAL HISTORY:  Elevated PSA level   TECHNIQUE:  1.5 Gena MRI with T1, T2, high b-value DWI and ADC sequences, and dynamic axial T1 postcontrast sequences.  20 ml MultiHance contrast was administered intravenously.  At the time of the initial dictation, the  CAD software is not available.  Abnormalities could therefore not be marked for fusion biopsy at this time.  COMPARISON:  No previous prostate MRI available  FINDINGS:   Prostate size: 5.2 x 4.4 x 4.4 cm maximum dimensions  Transition zone: T2 assessment: 2/5.  DWI assessment: 2/5.  DCE assessment: Negative  Peripheral zone: T2 assessment: 4/5.  DWI assessment: 3/5.  DCE assessment: Positive  There is a 13 by 8 by 6 mm T2 hypointense signal abnormality with markedly hypointense ADC signal and mildly hyperintense DWI signal with postcontrast enhancement in the posterolateral aspect of the right peripheral zone at the mid to apex prostate level compatible with category 4 signal abnormality.  Seminal vesicles: No signal intensity focus, restricted diffusion, or early enhancement is seen in the seminal vesicles Extracapsular extension: Fat planes along the pseudocapsule of the prostate and neurovascular bundles are seemingly well-preserved  Pelvis: No lymphadenopathy is seen by short axis diameter criteria. No aggressive bony lesions are identified.  There is diverticulosis of the colon without jenn diverticulitis.   Impression:  High level of suspicion for clinically significant prostate cancer.  There is a 13 x 8 x 6 mm category 4 signal abnormality in the right peripheral zone posteriorly and laterally at the mid to apex prostate level.  At the time of this dictation, the CAD/fusion software could not be accessed, and therefore this has not yet been marked for fusion biopsy.   PI-RADS version 2.1 score: 4.   Electronically signed by:Sergio Ji     PSA History:   PSA was 6.580 on 04/02/2024  PSA was 6.750 on 09/26/2023  PSA was 5.690 on 02/15/2022     Assessment:  Elevated PSA - Primary  Nocturia    Plan:  Urine culture   Schedule Prostate Fusion biopsies with Dr. Suarez on May 1, 2024  Surgery instructions given by Dr. Suarez's nurse HUSSEIN Cervantes   EKLINA   BMP, CBC  Will call Pathology Report when ready and make  further recommendations at that time   [April 26, 2024]  ---------------------------------------------------------------------------------------  The above note is from our nurse practitioner Carlos Burch's clinic visit with Mr. Alarcon on April 26, 2024.    Mr. Alarcon was admitted to the outpatient surgery department of [facility] this morning, May 1, 2024. He was taken to operative suite # 4 where he underwent UroNav guided fusion biopsies of the Prostate under general LMA anesthesia by Dr. Suarez (please see his operative procedure note for details). He tolerated the procedure well and was awakened and taken to PACU in stable condition. After PACU, he was returned to [facility]. He had an uneventful recovery requiring no post-op pain medications.  He voided adequately postoperatively.    Mr. Alarcon is awake, alert and feeling good and is requesting to go home.  Patient discharged to home with family on same day of admission May 1, 2024.  -------------------------------------------------------------------------  Below is Mr. Alarcon's pathology report:    [facility]  [address] [place] [id number]  Shahzad Alarcon [id number]  M, 68 yrs, 1955  [address] [place] [place] [address]  H: [phone] M: [phone]    Surgical Pathology (Final result) [id number]  Authorizing Provider: Chu Suarez Jr., MD  Ordering Provider: Chu Suarez Jr., MD  Ordering Location: [facility]  Collected: 05/01/2024 11:24 AM  Pathologist: Avinash Yip MD  Received: 05/01/2024 12:37 PM    Specimens:  A. Prostate, Right, A. region of interest right prostate biopsy  B. Prostate, Left, B. left lateral prostate biopsy  C. Prostate, Left, C. left midline prostate biopsy  D. Prostate, Right, D. right lateral prostate biopsy  E. Prostate, Right, E. right midline prostate biopsy    Final Diagnosis:  A. Prostate, right region of  "interest, needle core biopsy:  - Prostatic adenocarcinoma, Gayle score 3+3=6, involving approximately 25-30% of submitted tissue    B. Prostate, left lateral, needle core biopsy:  - Prostate tissue with a small focus of atypical glands insufficient for a diagnosis of malignancy    C. Prostate, left midline, needle core biopsy:  - Benign prostate tissue    D. Prostate, right lateral, needle core biopsy:  - Prostatic adenocarcinoma, Lewisburg score 3+3=6, involving approximately 30-40% of submitted tissue    E. Prostate, right midline, needle core biopsy:  - Benign prostate tissue    Comments:  The diagnosis of carcinoma is supported by immunohistochemistry for high molecular weight cytokeratin (performed on specimens A, B, and D). A few malignant glands are bordering on pattern 4, although they do not reach the threshold in my opinion. Dr. Mckinney has reviewed selected slides and agrees with the interpretation. Of note, this diagnosis places this patient in prognostic grade group 1 (out of 5) of a newly proposed consensus grading scheme (see reference below).    Reference: Erik et al. The 2014 International Society of Urological Pathology (ISUP) Consensus Conference on [place] Grading of Prostatic Carcinoma: Definition of Grading Patterns and Proposal for a New Grading System. Am J Surg Pathol. 2016 Feb;40(2):244-52.    Gross Description:  A. Prostate, Right, A. region of interest right prostate biopsy:  Specimen A is received in formalin as "prostate, right-region of interest right prostate biopsy" and consists of 6 hemorrhagic-tan stringy biopsy fragments ranging from 0.4-1.5 cm, submitted in block A1.    Grossing was completed by Jameel Galo.    B. Prostate, Left, B. left lateral prostate biopsy:  Specimen B is received in formalin as "prostate, left-left lateral prostate biopsy" and consists of 3 tan, stringy biopsy fragments ranging from 1.0-1.5 cm, submitted in block B1.    Grossing was completed by Jameel " "QING Galo.    C. Prostate, Left, C. left midline prostate biopsy:  Specimen C is received in formalin as "prostate, left-left midline prostate biopsy" and consists of 3 tan, stringy biopsy fragments ranging from 1.0-1.9 cm, submitted in block C1.    Grossing was completed by Jameel Galo.    D. Prostate, Right, D. right lateral prostate biopsy:  Specimen D is received in formalin as "prostate, right-right lateral prostate biopsy" and consists of 4 tan, stringy biopsy fragments ranging from 0.2-2.0 cm, submitted in block D1.    Grossing was completed by Jameel Galo.    E. Prostate, Right, E. right midline prostate biopsy:  Specimen E is received in formalin as "prostate, right-right midline prostate biopsy" and consists of 4 tan, stringy biopsy fragments ranging from 0.3 to 2.3 cm, submitted in block E1.    Grossing was completed by Jameel Galo.    Microscopic Description:  A microscopic examination was performed and the diagnosis reflects the findings.    Electronically signed by Avinash Yip MD on 5/2/2024 at 4:22 PM  "

## 2024-05-01 NOTE — OR NURSING
1150 REC'D TO PACU IN STABLE CONDITION. VSS. SATS 98% ON RA.  DENIES PAIN AT THIS TIME. NO DRAINAGE NOTED FROM RECTUM. WILL CONT TO MONITOR.    1205 UPDATE ON PT STATUS GIVEN TO WIFE VIA PHONE CALL.     TRANSFERRED TO ASC #10 AWAKE IN STABLE CONDITION. REPORT GIVEN TO ABDULLAHI ARAGON. /71 HR 91 SATS 100% ON RA. WIFE AT BEDSIDE.

## 2024-05-01 NOTE — OP NOTE
[facility] - Periop Services  General Surgery  Operative Note    SUMMARY     Date of Procedure: 5/1/2024     Procedure: Procedure(s) (LRB):  BIOPSY, PROSTATE, USING PROSTATE MAPPING (Bilateral)       Surgeons and Role:     * Chu Suarez Jr., MD - Primary    Assisting Surgeon: None    Pre-Operative Diagnosis: Elevated PSA [R97.20]    Post-Operative Diagnosis: Post-Op Diagnosis Codes:     * Elevated PSA [R97.20]    Anesthesia: General LMA    Operative Findings (including complications, if any):     Description of Technical Procedures:     The patient was taken to the hospital operative suite and satisfactory general LMA administered.  He was  placed in the left lateral decubitus position and prepared for transrectal biopsies of prostate. The rectal probe of the ultrasound was introduced and the prostate outlined.  The Uronav was used and the Amicus MedicusaCAD used to fuse the MRI images with the ultrasound images.  The patient had only 1 region of interest.  The region of interest was  described by Radiology as being in the right peripheral zone posteriorly and laterally at mid level to apex.   We made 7 biopsies from the lesion which was the region of interest.  Satisfactory cores were obtained.   The systematic biopsies of the prostate were then made taking biopsies on the left side from the lateral portion of the prostate at the base, mid gland, and apex followed by midline biopsies on the left from base, mid gland, and apex.  The same system of 6 biopsies that were performed on the left side  were performed on the right side of the prostate in similar fashion.  Imaging suggested that all of the biopsies from the region of interest were satisfactory and the region of interest was adequately biopsied.  The systematic biopsies also appeared to have good tissue obtained.  The procedure was completed and the patient awakened and taken to the recovery room.  There was estimated blood loss of less than 10 mL.        Significant Surgical Tasks Conducted by the Assistant(s), if Applicable:     Estimated Blood Loss (EBL): 5 mL           Implants: * No implants in log *    Specimens:   Specimen (24h ago, onward)       Start     Ordered    05/01/24 1129  Surgical Pathology  RELEASE UPON ORDERING         05/01/24 1129                            Condition: Stable    Disposition: PACU - hemodynamically stable.    Attestation: I was present and scrubbed for the entire procedure.

## 2024-05-01 NOTE — ANESTHESIA PROCEDURE NOTES
Intubation    Date/Time: 5/1/2024 11:20 AM    Performed by: Nasir Tran CRNA  Authorized by: Raman Moffett MD    Intubation:     Induction:  Intravenous    Intubated:  Postinduction    Mask Ventilation:  Easy mask    Attempts:  1    Attempted By:  CRNA    Difficult Airway Encountered?: No      Complications:  None    Airway Device:  Supraglottic airway/LMA    Airway Device Size:  4.0    Style/Cuff Inflation:  Cuffed    Endotracheal tube placement: TO SEAL.    Placement Verified By:  Capnometry    Complicating Factors:  None    Findings Post-Intubation:  BS equal bilateral and atraumatic/condition of teeth unchanged  Notes:      SMOOTH, TOLERATED WELL

## 2024-05-02 ENCOUNTER — TELEPHONE (OUTPATIENT)
Dept: UROLOGY | Facility: CLINIC | Age: 69
End: 2024-05-02
Payer: MEDICARE

## 2024-05-02 LAB
DHEA SERPL-MCNC: NORMAL
ESTROGEN SERPL-MCNC: NORMAL PG/ML
INSULIN SERPL-ACNC: NORMAL U[IU]/ML
LAB AP GROSS DESCRIPTION: NORMAL
LAB AP LABORATORY NOTES: NORMAL
T3RU NFR SERPL: NORMAL %

## 2024-05-03 NOTE — TELEPHONE ENCOUNTER
inal Diagnosis   A. Prostate, right region of interest, needle core biopsy:  - Prostatic adenocarcinoma, Kinards score 3+3=6, involving approximately 25-30% of submitted tissue     B. Prostate, left lateral, needle core biopsy:  - Prostate tissue with a small focus of atypical glands insufficient for a diagnosis of malignancy     C. Prostate, left midline, needle core biopsy:  - Benign prostate tissue      D. Prostate, right lateral, needle core biopsy:  - Prostatic adenocarcinoma, Kinards score 3+3=6, involving approximately 30-40% of submitted tissue     E. Prostate, right midline, needle core biopsy:  - Benign prostate tissue     Electronically signed by Avinash Yip MD on 5/2/2024 at 1622          See above pathology report on biopsies done on patient yesterday.  Telephone call to Mr. Berry.  He is doing okay clinically following biopsies.  He will be set up for counseling session within the next couple weeks but plan is for conservative follow-up of his low-grade prostate cancer.

## 2024-05-09 ENCOUNTER — OFFICE VISIT (OUTPATIENT)
Dept: UROLOGY | Facility: CLINIC | Age: 69
End: 2024-05-09
Payer: MEDICARE

## 2024-05-09 VITALS
BODY MASS INDEX: 28.75 KG/M2 | SYSTOLIC BLOOD PRESSURE: 133 MMHG | HEART RATE: 85 BPM | WEIGHT: 230 LBS | DIASTOLIC BLOOD PRESSURE: 90 MMHG

## 2024-05-09 DIAGNOSIS — C61 PROSTATE CANCER: Primary | ICD-10-CM

## 2024-05-09 DIAGNOSIS — R97.20 ELEVATED PSA: ICD-10-CM

## 2024-05-09 DIAGNOSIS — N32.0 BLADDER OUTLET OBSTRUCTION: ICD-10-CM

## 2024-05-09 PROCEDURE — 99213 OFFICE O/P EST LOW 20 MIN: CPT | Mod: S$PBB,,, | Performed by: UROLOGY

## 2024-05-09 PROCEDURE — 99213 OFFICE O/P EST LOW 20 MIN: CPT | Mod: PBBFAC | Performed by: UROLOGY

## 2024-05-09 NOTE — PATIENT INSTRUCTIONS
"Counseling as described.  Patient and wife agreeable with plan for conservative treatment with" watchful waiting"  They understand I plan to retire June 30.  Follow-up by Mr. Berman in 6 months with a repeat PSA.    Please go by the lab for PSA prior to office visit with Valdez Berman NP on 11/11/2024.  "

## 2024-05-10 NOTE — PROGRESS NOTES
Subjective     Patient ID: Shahzad Alarcon is a 68 y.o. male.    Chief Complaint: Prostate Cancer (Counseling Session)    This pleasant 67 year old male presents to the clinic with his wife as a new patient referral from [facility] for elevated PSA.  Patient's PSA on September 26, 2023 was elevated at 6.750. I discussed with the patient that he has had two elevated PSA's and the risk for prostate cancer. I discussed that we can treat with a month of antibiotics and repeat the PSA in  4 months or we could do the MRI of the prostate with possible fusion biopsies or we could do prostate biopsies in the clinic. He desires to do the antibiotics for a month and repeat the PSA in 4 months. He denies any allergies to sulfa and we will treat with Bactrim DS for a month as outlined in the plan. I discussed the side effects of this medication to include a rash. They were encouraged to read up on the side effects. He denies any family history of prostate cancer. He is pleased with the way he voids. His IPSS score is 3 that reflects a weak stream and nocturia 1 time a night. His PVR is 0 mls. He denies dysuria, hematuria, incomplete bladder emptying, frequency, intermittency, urgency or straining. He denies fever, chills, nausea or vomiting. He denies any abdominal, back or bladder pain. I discussed the plan in detail with the patient and wife and they are in agreement with the plan. All their questions were answered at today's visit.      PSA History:   PSA was 6.750 on 09/26/2023  PSA was 5.690 on 02/15/2022  -------------------------------------------------------------------------------------------------------------------------------- [December 7, 2023].                 This pleasant 68 year old male presents to the clinic with his wife for follow up of elevated PSA.  Patient states he is doing well today.  Patient's PSA on September 26, 2023 was elevated at 6.750 and elevated at 5.690 on February 15, 2022.  He denies any  family history of prostate cancer. We treated with a month of Bactrim DS one bid.  However, the patient states he only received a two week supply of the bactrim.  His repeat PSA was still elevated at 6.580 on April 2, 2024. We discussed doing the MRI to further evaluate the prostate and they are agreeable. I will see him back int he clinic the day after the MRI to discuss the results and make further recommendations.  He is pleased with the way he voids. His IPSS score is a 1 that reflects nocturia 1 time a night. His PVR is 0 mls. He denies dysuria, hematuria, incomplete bladder emptying, frequency, intermittency, urgency weak stream or straining. He denies fever, chills, nausea or vomiting. He denies any abdominal, back or bladder pain. I discussed the plan in detail with the patient and wife and they are in agreement with the plan. All their questions were answered at today's visit.         PSA History:   PSA was 6.580 on 04/02/2024  PSA was 6.750 on 09/26/2023  PSA was 5.690 on 02/15/2022  ------------------------------------------------------------------------------------------------------------------------------ [April 8, 2024].         This pleasant 68 year old male presents to the clinic with his wife for follow up of MRI results and elevated PSA.  Patient is doing good today.  His MRI of the Prostate with and without contrast done on April 26, 2024 showed a PI-RADS version 2.1 score: 4 High (clinically significant cancer is likely to be present) . His Prostate size: 5.2 x 4.4 x 4.4 cm.  There is a 13 x 8 x 6 mm category 4 signal abnormality in the right peripheral zone posteriorly and laterally at the mid to apex prostate level.  I reviewed and discussed the MRI results with Dr. Suarez, the patient and wife.  Dr. Suarez recommended prostate fusion biopsies and they are agreeable.  This will be scheduled for May 1, 2024.  Surgery instructions were given to the patient and wife by Dr. Suarez's nurse HUSSEIN Cervantes.  He  will get the lab work and EKG today on the way out.  I will culture his urine and treat if indicated. Patient's PSA on September 26, 2023 was elevated at 6.750 and elevated at 5.690 on February 15, 2022.  His repeat PSA was still elevated at 6.580 on April 2, 2024. He denies any family history of prostate cancer.  He denies any blood thinners.      He is pleased with the way he voids. His IPSS score is a 1 that reflects nocturia 1 time a night. His PVR is 0 mls. He denies dysuria, hematuria, incomplete bladder emptying, frequency, intermittency, urgency weak stream or straining. He denies fever, chills, nausea or vomiting. He denies any abdominal, back or bladder pain. I discussed the plan in detail with Dr. Suarez, the patient and wife and they are in agreement with the plan. All their questions were answered at today's visit.      MRI PROSTATE W W/O CONTRAST done on April 26, 2024.  CLINICAL HISTORY:  Elevated PSA level   TECHNIQUE:  1.5 Gena MRI with T1, T2, high b-value DWI and ADC sequences, and dynamic axial T1 postcontrast sequences.  20 ml MultiHance contrast was administered intravenously.  At the time of the initial dictation, the CAD software is not available.  Abnormalities could therefore not be marked for fusion biopsy at this time.  COMPARISON:  No previous prostate MRI available  FINDINGS:   Prostate size: 5.2 x 4.4 x 4.4 cm maximum dimensions  Transition zone: T2 assessment: 2/5.  DWI assessment: 2/5.  DCE assessment: Negative  Peripheral zone: T2 assessment: 4/5.  DWI assessment: 3/5.  DCE assessment: Positive  There is a 13 by 8 by 6 mm T2 hypointense signal abnormality with markedly hypointense ADC signal and mildly hyperintense DWI signal with postcontrast enhancement in the posterolateral aspect of the right peripheral zone at the mid to apex prostate level compatible with category 4 signal abnormality.  Seminal vesicles: No signal intensity focus, restricted diffusion, or early enhancement is  seen in the seminal vesicles Extracapsular extension: Fat planes along the pseudocapsule of the prostate and neurovascular bundles are seemingly well-preserved  Pelvis: No lymphadenopathy is seen by short axis diameter criteria. No aggressive bony lesions are identified.  There is diverticulosis of the colon without jenn diverticulitis.   Impression:  High level of suspicion for clinically significant prostate cancer.  There is a 13 x 8 x 6 mm category 4 signal abnormality in the right peripheral zone posteriorly and laterally at the mid to apex prostate level.  At the time of this dictation, the CAD/fusion software could not be accessed, and therefore this has not yet been marked for fusion biopsy.   PI-RADS version 2.1 score: 4.   Electronically signed by:Sergio Ji     PSA History:   PSA was 6.580 on 04/02/2024  PSA was 6.750 on 09/26/2023  PSA was 5.690 on 02/15/2022   ---------------------------------------------------------------------------------------------------------------------  [April 26, 2024].         Review of Systems   Constitutional:  Negative for activity change and fever.   HENT:  Negative for hearing loss and trouble swallowing.    Eyes:  Negative for visual disturbance.   Respiratory:  Negative for cough, shortness of breath and wheezing.    Cardiovascular:  Negative for chest pain.   Gastrointestinal:  Negative for abdominal pain, diarrhea, nausea and vomiting.   Endocrine: Negative for polyuria.   Genitourinary:  Negative for bladder incontinence, decreased urine volume, difficulty urinating, discharge, dysuria, enuresis, erectile dysfunction, flank pain, frequency, genital sores, hematuria, penile pain, penile swelling, scrotal swelling, testicular pain and urgency.        Elevated PSA        Nocturia    Musculoskeletal:  Negative for back pain and gait problem.   Integumentary:  Negative for rash.   Neurological:  Negative for speech difficulty and weakness.   Psychiatric/Behavioral:   Negative for behavioral problems and confusion.    ----------------------------------------------------------------------------------------------------------------------------------------------------------------------------------    Above notes are the notes of Mr. Carlos Berman from April 26      Final Diagnosis  A. Prostate, right region of interest, needle core biopsy:  - Prostatic adenocarcinoma, Gayle score 3+3=6, involving approximately 25-30% of submitted tissue     B. Prostate, left lateral, needle core biopsy:  - Prostate tissue with a small focus of atypical glands insufficient for a diagnosis of malignancy     C. Prostate, left midline, needle core biopsy:  - Benign prostate tissue      D. Prostate, right lateral, needle core biopsy:  - Prostatic adenocarcinoma, Steger score 3+3=6, involving approximately 30-40% of submitted tissue     E. Prostate, right midline, needle core biopsy:  - Benign prostate tissue    Electronically signed by Avinash Yip MD on 5/2/2024 at 1622  Comments   The diagnosis of carcinoma is supported by immunohistochemistry for high molecular weight cytokeratin (performed on specimens A, B, and D).  A few malignant glands are bordering on pattern 4, although they do not reach the threshold in my opinion.  Dr. Mckinney has reviewed selected slides and agrees with the interpretation.      Of note, this diagnosis places this patient in prognostic grade group 1 (out of 5) of a newly proposed consensus grading scheme (see reference below)  ----------------------------------------------------------------------------------------------------------------------------------------------------------------------------------------------------------------------------------------------------------------------------------------------------------------------------------------------------------------------------------------------------------------------------------------------  Above is the  "pathology report from fusion biopsies performed on Mr. Alarcon last week.  Patient and his wife were brought in today for counseling about the diagnosis of prostate cancer.  He has an excellent prognosis as he only has Gayle's 3+3=6 adenocarcinoma prostate.  We gave a copy of the pathology report and a [employer] booklet on prostate cancer to patient.  Went over the grading system and the suspected staging of his cancer.    Told him that conservative treatment is all I would recommend and I think that is the feeling of most urologists involved in treatment of prostate cancer for management of low-grade cancers.  I would not recommend doing anything other than monitor and consider additional biopsies in the future if PSA starts going up.  Questions answered.  Patient and his wife seem to be satisfied with the conservative management of the problem. [May 9, 2024]    Review of Systems       Objective     Physical Exam  Constitutional:       Appearance: Normal appearance. He is normal weight.   Neurological:      Mental Status: He is alert.   Psychiatric:         Mood and Affect: Mood normal.         Behavior: Behavior normal.     Urinalysis reveals several red blood cells with occasional pus.  Dipstick had 3+ blood with pH 6.0 and specific gravity 1.030     Assessment and Plan     1. Prostate cancer  -     PSA, Total (Diagnostic); Future; Expected date: 11/09/2024    2. Elevated PSA    3. Bladder outlet obstruction      Counseling as described.  Patient and wife agreeable with plan for conservative treatment with" watchful waiting"  They understand I plan to retire June 30.  Follow-up by Yasmin Berman in 6 months with a repeat PSA.        Follow up in 6 months (on 11/11/2024) for 6 month OV with PSA at 2:30 with Valdez Berman NP.    "

## 2024-06-09 DIAGNOSIS — Z71.89 COMPLEX CARE COORDINATION: ICD-10-CM

## 2024-09-25 ENCOUNTER — OFFICE VISIT (OUTPATIENT)
Dept: FAMILY MEDICINE | Facility: CLINIC | Age: 69
End: 2024-09-25
Payer: MEDICARE

## 2024-09-25 ENCOUNTER — PATIENT MESSAGE (OUTPATIENT)
Dept: FAMILY MEDICINE | Facility: CLINIC | Age: 69
End: 2024-09-25
Payer: MEDICARE

## 2024-09-25 VITALS
SYSTOLIC BLOOD PRESSURE: 166 MMHG | BODY MASS INDEX: 28.72 KG/M2 | HEART RATE: 60 BPM | OXYGEN SATURATION: 100 % | DIASTOLIC BLOOD PRESSURE: 80 MMHG | HEIGHT: 75 IN | TEMPERATURE: 98 F | WEIGHT: 231 LBS

## 2024-09-25 DIAGNOSIS — I10 ESSENTIAL HYPERTENSION: Primary | ICD-10-CM

## 2024-09-25 LAB
ALBUMIN SERPL BCP-MCNC: 3.7 G/DL (ref 3.5–5)
ALBUMIN/GLOB SERPL: 1.2 {RATIO}
ALP SERPL-CCNC: 76 U/L (ref 45–115)
ALT SERPL W P-5'-P-CCNC: 18 U/L (ref 16–61)
ANION GAP SERPL CALCULATED.3IONS-SCNC: 9 MMOL/L (ref 7–16)
AST SERPL W P-5'-P-CCNC: 15 U/L (ref 15–37)
BASOPHILS # BLD AUTO: 0.03 K/UL (ref 0–0.2)
BASOPHILS NFR BLD AUTO: 0.5 % (ref 0–1)
BILIRUB SERPL-MCNC: 1 MG/DL (ref ?–1.2)
BUN SERPL-MCNC: 18 MG/DL (ref 7–18)
BUN/CREAT SERPL: 18 (ref 6–20)
CALCIUM SERPL-MCNC: 9 MG/DL (ref 8.5–10.1)
CHLORIDE SERPL-SCNC: 109 MMOL/L (ref 98–107)
CHOLEST SERPL-MCNC: 165 MG/DL (ref 0–200)
CHOLEST/HDLC SERPL: 3.4 {RATIO}
CO2 SERPL-SCNC: 29 MMOL/L (ref 21–32)
CREAT SERPL-MCNC: 1 MG/DL (ref 0.7–1.3)
DIFFERENTIAL METHOD BLD: ABNORMAL
EGFR (NO RACE VARIABLE) (RUSH/TITUS): 81 ML/MIN/1.73M2
EOSINOPHIL # BLD AUTO: 0.14 K/UL (ref 0–0.5)
EOSINOPHIL NFR BLD AUTO: 2.2 % (ref 1–4)
ERYTHROCYTE [DISTWIDTH] IN BLOOD BY AUTOMATED COUNT: 12.2 % (ref 11.5–14.5)
GLOBULIN SER-MCNC: 3 G/DL (ref 2–4)
GLUCOSE SERPL-MCNC: 90 MG/DL (ref 74–106)
HCT VFR BLD AUTO: 44.6 % (ref 40–54)
HDLC SERPL-MCNC: 49 MG/DL (ref 40–60)
HGB BLD-MCNC: 14.3 G/DL (ref 13.5–18)
IMM GRANULOCYTES # BLD AUTO: 0.03 K/UL (ref 0–0.04)
IMM GRANULOCYTES NFR BLD: 0.5 % (ref 0–0.4)
LDLC SERPL CALC-MCNC: 68 MG/DL
LDLC/HDLC SERPL: 1.4 {RATIO}
LYMPHOCYTES # BLD AUTO: 2.17 K/UL (ref 1–4.8)
LYMPHOCYTES NFR BLD AUTO: 34 % (ref 27–41)
MCH RBC QN AUTO: 29.6 PG (ref 27–31)
MCHC RBC AUTO-ENTMCNC: 32.1 G/DL (ref 32–36)
MCV RBC AUTO: 92.3 FL (ref 80–96)
MONOCYTES # BLD AUTO: 0.69 K/UL (ref 0–0.8)
MONOCYTES NFR BLD AUTO: 10.8 % (ref 2–6)
MPC BLD CALC-MCNC: 9.6 FL (ref 9.4–12.4)
NEUTROPHILS # BLD AUTO: 3.32 K/UL (ref 1.8–7.7)
NEUTROPHILS NFR BLD AUTO: 52 % (ref 53–65)
NONHDLC SERPL-MCNC: 116 MG/DL
NRBC # BLD AUTO: 0 X10E3/UL
NRBC, AUTO (.00): 0 %
PLATELET # BLD AUTO: 266 K/UL (ref 150–400)
POTASSIUM SERPL-SCNC: 3.6 MMOL/L (ref 3.5–5.1)
PROT SERPL-MCNC: 6.7 G/DL (ref 6.4–8.2)
RBC # BLD AUTO: 4.83 M/UL (ref 4.6–6.2)
SODIUM SERPL-SCNC: 143 MMOL/L (ref 136–145)
TRIGL SERPL-MCNC: 239 MG/DL (ref 35–150)
TSH SERPL DL<=0.005 MIU/L-ACNC: 1.79 UIU/ML (ref 0.36–3.74)
VLDLC SERPL-MCNC: 48 MG/DL
WBC # BLD AUTO: 6.38 K/UL (ref 4.5–11)

## 2024-09-25 PROCEDURE — 85025 COMPLETE CBC W/AUTO DIFF WBC: CPT | Mod: ,,, | Performed by: CLINICAL MEDICAL LABORATORY

## 2024-09-25 PROCEDURE — 80053 COMPREHEN METABOLIC PANEL: CPT | Mod: ,,, | Performed by: CLINICAL MEDICAL LABORATORY

## 2024-09-25 PROCEDURE — 84443 ASSAY THYROID STIM HORMONE: CPT | Mod: ,,, | Performed by: CLINICAL MEDICAL LABORATORY

## 2024-09-25 PROCEDURE — 99214 OFFICE O/P EST MOD 30 MIN: CPT | Mod: ,,, | Performed by: NURSE PRACTITIONER

## 2024-09-25 PROCEDURE — 80061 LIPID PANEL: CPT | Mod: ,,, | Performed by: CLINICAL MEDICAL LABORATORY

## 2024-09-25 RX ORDER — AMLODIPINE BESYLATE 5 MG/1
5 TABLET ORAL DAILY
Qty: 90 TABLET | Refills: 3 | Status: SHIPPED | OUTPATIENT
Start: 2024-09-25

## 2024-09-25 NOTE — PROGRESS NOTES
Subjective     Patient ID: Shahzad Alarcon is a 68 y.o. male.    Chief Complaint: Annual Exam    Pt presents for a hypertension follow-up. Pt has been out of the amlodipine for several days.       Review of Systems   Constitutional:  Negative for activity change, appetite change, fatigue and fever.   HENT:  Negative for nasal congestion, nosebleeds, postnasal drip, rhinorrhea, sinus pressure/congestion, sneezing and sore throat.    Eyes:  Negative for pain and itching.   Respiratory:  Negative for cough, chest tightness, shortness of breath, wheezing and stridor.    Cardiovascular:  Negative for chest pain.   Gastrointestinal:  Negative for abdominal pain.   Genitourinary:  Negative for dysuria.   Musculoskeletal:  Negative for back pain.   Neurological:  Negative for dizziness and headaches.   Psychiatric/Behavioral:  Negative for behavioral problems and confusion.           Objective     Physical Exam  Vitals and nursing note reviewed.   Constitutional:       Appearance: Normal appearance.   Cardiovascular:      Rate and Rhythm: Normal rate and regular rhythm.      Heart sounds: Normal heart sounds.   Pulmonary:      Effort: Pulmonary effort is normal.      Breath sounds: Normal breath sounds.   Musculoskeletal:         General: Normal range of motion.   Neurological:      Mental Status: He is alert and oriented to person, place, and time.   Psychiatric:         Mood and Affect: Mood normal.         Behavior: Behavior normal.            Assessment and Plan     1. Essential hypertension  -     amLODIPine (NORVASC) 5 MG tablet; Take 1 tablet (5 mg total) by mouth once daily.  Dispense: 90 tablet; Refill: 3  -     CBC Auto Differential; Future; Expected date: 09/25/2024  -     Comprehensive Metabolic Panel; Future; Expected date: 09/25/2024  -     Lipid Panel; Future; Expected date: 09/25/2024  -     TSH; Future; Expected date: 09/25/2024        Return to the clinic in 2 weeks for a hypertension follow-up. Will call  pt with lab results. Pt declined a pneumonia vaccine.          Follow up in about 6 months (around 3/25/2025).

## 2024-11-06 ENCOUNTER — TELEPHONE (OUTPATIENT)
Dept: UROLOGY | Facility: CLINIC | Age: 69
End: 2024-11-06
Payer: MEDICARE

## 2024-11-06 NOTE — PROGRESS NOTES
Subjective     Patient ID: Shahzad Alarcon is a 68 y.o. male.    Chief Complaint: No chief complaint on file.    This pleasant 67 year old male presents to the clinic with his wife as a new patient referral from [facility] for elevated PSA.  Patient's PSA on September 26, 2023 was elevated at 6.750. I discussed with the patient that he has had two elevated PSA's and the risk for prostate cancer. I discussed that we can treat with a month of antibiotics and repeat the PSA in  4 months or we could do the MRI of the prostate with possible fusion biopsies or we could do prostate biopsies in the clinic. He desires to do the antibiotics for a month and repeat the PSA in 4 months. He denies any allergies to sulfa and we will treat with Bactrim DS for a month as outlined in the plan. I discussed the side effects of this medication to include a rash. They were encouraged to read up on the side effects. He denies any family history of prostate cancer. He is pleased with the way he voids. His IPSS score is 3 that reflects a weak stream and nocturia 1 time a night. His PVR is 0 mls. He denies dysuria, hematuria, incomplete bladder emptying, frequency, intermittency, urgency or straining. He denies fever, chills, nausea or vomiting. He denies any abdominal, back or bladder pain. I discussed the plan in detail with the patient and wife and they are in agreement with the plan. All their questions were answered at today's visit.      PSA History:   PSA was 6.750 on 09/26/2023  PSA was 5.690 on 02/15/2022  -------------------------------------------------------------------------------------------------------------------------------- [December 7, 2023].                 This pleasant 68 year old male presents to the clinic with his wife for follow up of elevated PSA.  Patient states he is doing well today.  Patient's PSA on September 26, 2023 was elevated at 6.750 and elevated at 5.690 on February 15, 2022.  He denies any family  history of prostate cancer. We treated with a month of Bactrim DS one bid.  However, the patient states he only received a two week supply of the bactrim.  His repeat PSA was still elevated at 6.580 on April 2, 2024. We discussed doing the MRI to further evaluate the prostate and they are agreeable. I will see him back int he clinic the day after the MRI to discuss the results and make further recommendations.  He is pleased with the way he voids. His IPSS score is a 1 that reflects nocturia 1 time a night. His PVR is 0 mls. He denies dysuria, hematuria, incomplete bladder emptying, frequency, intermittency, urgency weak stream or straining. He denies fever, chills, nausea or vomiting. He denies any abdominal, back or bladder pain. I discussed the plan in detail with the patient and wife and they are in agreement with the plan. All their questions were answered at today's visit.         PSA History:   PSA was 6.580 on 04/02/2024  PSA was 6.750 on 09/26/2023  PSA was 5.690 on 02/15/2022  ------------------------------------------------------------------------------------------------------------------------------ [April 8, 2024].         This pleasant 68 year old male presents to the clinic with his wife for follow up of MRI results and elevated PSA.  Patient is doing good today.  His MRI of the Prostate with and without contrast done on April 26, 2024 showed a PI-RADS version 2.1 score: 4 High (clinically significant cancer is likely to be present) . His Prostate size: 5.2 x 4.4 x 4.4 cm.  There is a 13 x 8 x 6 mm category 4 signal abnormality in the right peripheral zone posteriorly and laterally at the mid to apex prostate level.  I reviewed and discussed the MRI results with Dr. Suarez, the patient and wife.  Dr. Suarez recommended prostate fusion biopsies and they are agreeable.  This will be scheduled for May 1, 2024.  Surgery instructions were given to the patient and wife by Dr. Suarez's nurse HUSSEIN Cervantes.  He will  get the lab work and EKG today on the way out.  I will culture his urine and treat if indicated. Patient's PSA on September 26, 2023 was elevated at 6.750 and elevated at 5.690 on February 15, 2022.  His repeat PSA was still elevated at 6.580 on April 2, 2024. He denies any family history of prostate cancer.  He denies any blood thinners.      He is pleased with the way he voids. His IPSS score is a 1 that reflects nocturia 1 time a night. His PVR is 0 mls. He denies dysuria, hematuria, incomplete bladder emptying, frequency, intermittency, urgency weak stream or straining. He denies fever, chills, nausea or vomiting. He denies any abdominal, back or bladder pain. I discussed the plan in detail with Dr. Suarez, the patient and wife and they are in agreement with the plan. All their questions were answered at today's visit.      MRI PROSTATE W W/O CONTRAST done on April 26, 2024.  CLINICAL HISTORY:  Elevated PSA level   TECHNIQUE:  1.5 Gena MRI with T1, T2, high b-value DWI and ADC sequences, and dynamic axial T1 postcontrast sequences.  20 ml MultiHance contrast was administered intravenously.  At the time of the initial dictation, the CAD software is not available.  Abnormalities could therefore not be marked for fusion biopsy at this time.  COMPARISON:  No previous prostate MRI available  FINDINGS:   Prostate size: 5.2 x 4.4 x 4.4 cm maximum dimensions  Transition zone: T2 assessment: 2/5.  DWI assessment: 2/5.  DCE assessment: Negative  Peripheral zone: T2 assessment: 4/5.  DWI assessment: 3/5.  DCE assessment: Positive  There is a 13 by 8 by 6 mm T2 hypointense signal abnormality with markedly hypointense ADC signal and mildly hyperintense DWI signal with postcontrast enhancement in the posterolateral aspect of the right peripheral zone at the mid to apex prostate level compatible with category 4 signal abnormality.  Seminal vesicles: No signal intensity focus, restricted diffusion, or early enhancement is seen  in the seminal vesicles Extracapsular extension: Fat planes along the pseudocapsule of the prostate and neurovascular bundles are seemingly well-preserved  Pelvis: No lymphadenopathy is seen by short axis diameter criteria. No aggressive bony lesions are identified.  There is diverticulosis of the colon without jenn diverticulitis.   Impression:  High level of suspicion for clinically significant prostate cancer.  There is a 13 x 8 x 6 mm category 4 signal abnormality in the right peripheral zone posteriorly and laterally at the mid to apex prostate level.  At the time of this dictation, the CAD/fusion software could not be accessed, and therefore this has not yet been marked for fusion biopsy.   PI-RADS version 2.1 score: 4.   Electronically signed by:Sergio Ji     PSA History:   PSA was 6.580 on 04/02/2024  PSA was 6.750 on 09/26/2023  PSA was 5.690 on 02/15/2022   ---------------------------------------------------------------------------------------------------------------------  [April 26, 2024].   ----------------------------------------------------------------------------------------------------------------------------------------------------------------------------------    Above notes are the notes of Mr. Carlos Berman from April 26        Final Diagnosis  A. Prostate, right region of interest, needle core biopsy:  - Prostatic adenocarcinoma, Tanner score 3+3=6, involving approximately 25-30% of submitted tissue     B. Prostate, left lateral, needle core biopsy:  - Prostate tissue with a small focus of atypical glands insufficient for a diagnosis of malignancy     C. Prostate, left midline, needle core biopsy:  - Benign prostate tissue      D. Prostate, right lateral, needle core biopsy:  - Prostatic adenocarcinoma, Tanner score 3+3=6, involving approximately 30-40% of submitted tissue     E. Prostate, right midline, needle core biopsy:  - Benign prostate tissue    Electronically signed by  Avinash Yip MD on 5/2/2024 at 1622  Comments         The diagnosis of carcinoma is supported by immunohistochemistry for high molecular weight cytokeratin (performed on specimens A, B, and D).  A few malignant glands are bordering on pattern 4, although they do not reach the threshold in my opinion.  Dr. Mckinney has reviewed selected slides and agrees with the interpretation.      Of note, this diagnosis places this patient in prognostic grade group 1 (out of 5) of a newly proposed consensus grading scheme (see reference below)  ----------------------------------------------------------------------------------------------------------------------------------------------------------------------------------------------------------------------------------------------------------------------------------------------------------------------------------------------------------------------------------------------------------------------------------------------  Above is the pathology report from fusion biopsies performed on Mr. Alarcon last week.  Patient and his wife were brought in today for counseling about the diagnosis of prostate cancer.  He has an excellent prognosis as he only has Gayle's 3+3=6 adenocarcinoma prostate.  We gave a copy of the pathology report and a [employer] booklet on prostate cancer to patient.  Went over the grading system and the suspected staging of his cancer.    Told him that conservative treatment is all I would recommend and I think that is the feeling of most urologists involved in treatment of prostate cancer for management of low-grade cancers.  I would not recommend doing anything other than monitor and consider additional biopsies in the future if PSA starts going up.  Questions answered.  Patient and his wife seem to be satisfied with the conservative management of the problem. [May 9,  2024]  -----------------------------------------------------------------------------------------------------------------------------------------------------------------------------------------------------------------------------------------------------------------------------------------------------------------------  The above notes from Dr. Chu Suarez in the EMR system    This pleasant 68-year-old male presents to the clinic with wife for follow-up of prostate cancer.  Patient states he is doing good today.  MRI PROSTATE W W/O CONTRAST done on April 26, 2024 showed a  PI-RADS version 2.1 score: 4 High (clinically significant cancer is likely to be present).  Prostate size was 5.2 x 4.4 x 4.4 cm maximum dimensions by MRI.  Patient underwent MRI guided prostate fusion biopsies on May 1, 2024 with Dr. Chu Suarez.  His pathology report revealed Montague score 3+3=6 adenocarcinoma of the prostate.  Dr. Suarez reviewed the pathology report and results with the patient and wife at the May 9, 2024 visit.  His recommendation was conservative treatment with monitoring the PSA every six-month.  He also recommended that if the PSA begins to go up in the future he would recommend repeating additional biopsies.  I reviewed this plan with the patient and wife and they are in agreement with it.  He is pleased with the way he voids he reports nocturia 1 time a night.  He denies dysuria, hematuria, incomplete bladder emptying, frequency, intermittency, urgency, weak stream, or straining to urinate.  He denies fever, chills, nausea, vomiting.  He denies any  pains.  He desires no intervention for the way he voids.  I discussed the plan in detail with the patient and wife and they are in agreement with the plan.  All their questions were answered at today's visit.  I spent 30 minutes counseling and shared decision-making with this patient.      PSA History:   PSA was 6.110 on 11/05/2024     PSA was 6.580 on 04/02/2024  PSA was  6.750 on 09/26/2023  PSA was 5.690 on 02/15/2022 ---------------------------------------------------------------------------------------------------------------------------  [November 11, 2024].         Review of Systems   Constitutional:  Negative for activity change and fever.   HENT:  Negative for hearing loss and trouble swallowing.    Eyes:  Negative for visual disturbance.   Respiratory:  Negative for cough, shortness of breath and wheezing.    Cardiovascular:  Negative for chest pain.   Gastrointestinal:  Negative for abdominal pain, diarrhea, nausea and vomiting.   Endocrine: Negative for polyuria.   Genitourinary:  Negative for bladder incontinence, decreased urine volume, difficulty urinating, discharge, dysuria, enuresis, erectile dysfunction, flank pain, frequency, genital sores, hematuria, penile pain, penile swelling, scrotal swelling, testicular pain and urgency.        Prostate cancer       Elevated PSA       Nocturia   Musculoskeletal:  Negative for back pain and gait problem.   Integumentary:  Negative for rash.   Neurological:  Negative for speech difficulty and weakness.   Psychiatric/Behavioral:  Negative for behavioral problems and confusion.           Objective     Physical Exam  Vitals and nursing note reviewed.   Constitutional:       General: He is not in acute distress.     Appearance: Normal appearance. He is not ill-appearing, toxic-appearing or diaphoretic.   HENT:      Head: Normocephalic.   Eyes:      Extraocular Movements: Extraocular movements intact.   Cardiovascular:      Rate and Rhythm: Normal rate and regular rhythm.      Heart sounds: Normal heart sounds.   Pulmonary:      Effort: Pulmonary effort is normal. No respiratory distress.      Breath sounds: Normal breath sounds. No wheezing, rhonchi or rales.   Abdominal:      General: Bowel sounds are normal.      Palpations: Abdomen is soft.      Tenderness: There is no abdominal tenderness. There is no right CVA tenderness, left  CVA tenderness, guarding or rebound.   Genitourinary:     Rectum: Normal.      Comments: KENNETH 15 grams, smooth, symmetrical, not indurated, not tender  Musculoskeletal:         General: Normal range of motion.      Cervical back: Normal range of motion. No rigidity.   Skin:     General: Skin is warm and dry.   Neurological:      General: No focal deficit present.      Mental Status: He is alert and oriented to person, place, and time.      Motor: No weakness.      Coordination: Coordination normal.      Gait: Gait normal.   Psychiatric:         Mood and Affect: Mood normal.         Behavior: Behavior normal.         Thought Content: Thought content normal.          Assessment and Plan     Problem List Items Addressed This Visit          Renal/    Elevated PSA    Nocturia       Oncology    Prostate cancer - Primary    Relevant Orders    PSA, Total (Diagnostic)              PSA in 6 months  We will consider repeating prostate biopsies if indicated in the future  Patient is pleased with the way he voids and desires no further intervention at this time for voiding]  Follow-up with urology in 6 months

## 2024-11-06 NOTE — TELEPHONE ENCOUNTER
----- Message from Valdez Berman NP sent at 11/6/2024 10:47 AM CST -----  Please notify patient that his PSA went down to 6.110 compared to 6.5807 months ago recommend continued conservative treatment with PSAs and six-month

## 2024-11-11 ENCOUNTER — OFFICE VISIT (OUTPATIENT)
Dept: UROLOGY | Facility: CLINIC | Age: 69
End: 2024-11-11
Payer: MEDICARE

## 2024-11-11 VITALS
HEART RATE: 81 BPM | BODY MASS INDEX: 29.22 KG/M2 | DIASTOLIC BLOOD PRESSURE: 109 MMHG | SYSTOLIC BLOOD PRESSURE: 145 MMHG | OXYGEN SATURATION: 98 % | HEIGHT: 75 IN | WEIGHT: 235 LBS

## 2024-11-11 DIAGNOSIS — R35.1 NOCTURIA: ICD-10-CM

## 2024-11-11 DIAGNOSIS — R97.20 ELEVATED PSA: ICD-10-CM

## 2024-11-11 DIAGNOSIS — C61 PROSTATE CANCER: Primary | ICD-10-CM

## 2024-11-11 PROCEDURE — 99214 OFFICE O/P EST MOD 30 MIN: CPT | Mod: PBBFAC | Performed by: NURSE PRACTITIONER

## 2024-11-11 PROCEDURE — 99214 OFFICE O/P EST MOD 30 MIN: CPT | Mod: S$PBB,,, | Performed by: NURSE PRACTITIONER

## 2024-11-11 PROCEDURE — 99999 PR PBB SHADOW E&M-EST. PATIENT-LVL IV: CPT | Mod: PBBFAC,,, | Performed by: NURSE PRACTITIONER

## 2024-11-11 RX ORDER — FLUTICASONE PROPIONATE 50 MCG
2 SPRAY, SUSPENSION (ML) NASAL
COMMUNITY
Start: 2024-09-25

## 2025-03-25 ENCOUNTER — OFFICE VISIT (OUTPATIENT)
Dept: FAMILY MEDICINE | Facility: CLINIC | Age: 70
End: 2025-03-25
Payer: MEDICARE

## 2025-03-25 VITALS
BODY MASS INDEX: 28.97 KG/M2 | DIASTOLIC BLOOD PRESSURE: 83 MMHG | SYSTOLIC BLOOD PRESSURE: 135 MMHG | TEMPERATURE: 99 F | HEIGHT: 75 IN | HEART RATE: 72 BPM | OXYGEN SATURATION: 98 % | WEIGHT: 233 LBS

## 2025-03-25 DIAGNOSIS — I10 ESSENTIAL HYPERTENSION: Primary | ICD-10-CM

## 2025-03-25 DIAGNOSIS — Z12.11 SCREENING FOR MALIGNANT NEOPLASM OF COLON: ICD-10-CM

## 2025-03-25 PROBLEM — C61 PROSTATE CANCER: Status: RESOLVED | Noted: 2024-11-11 | Resolved: 2025-03-25

## 2025-03-25 LAB
ALBUMIN SERPL BCP-MCNC: 4 G/DL (ref 3.4–4.8)
ALBUMIN/GLOB SERPL: 1.8 {RATIO}
ALP SERPL-CCNC: 62 U/L (ref 40–150)
ALT SERPL W P-5'-P-CCNC: 13 U/L
ANION GAP SERPL CALCULATED.3IONS-SCNC: 12 MMOL/L (ref 7–16)
AST SERPL W P-5'-P-CCNC: 20 U/L (ref 11–45)
BASOPHILS # BLD AUTO: 0.04 K/UL (ref 0–0.2)
BASOPHILS NFR BLD AUTO: 0.7 % (ref 0–1)
BILIRUB SERPL-MCNC: 1 MG/DL
BUN SERPL-MCNC: 18 MG/DL (ref 8–26)
BUN/CREAT SERPL: 18 (ref 6–20)
CALCIUM SERPL-MCNC: 8.8 MG/DL (ref 8.8–10)
CHLORIDE SERPL-SCNC: 106 MMOL/L (ref 98–107)
CHOLEST SERPL-MCNC: 158 MG/DL
CHOLEST/HDLC SERPL: 3.3 {RATIO}
CO2 SERPL-SCNC: 25 MMOL/L (ref 23–31)
CREAT SERPL-MCNC: 1.02 MG/DL (ref 0.72–1.25)
DIFFERENTIAL METHOD BLD: ABNORMAL
EGFR (NO RACE VARIABLE) (RUSH/TITUS): 79 ML/MIN/1.73M2
EOSINOPHIL # BLD AUTO: 0.12 K/UL (ref 0–0.5)
EOSINOPHIL NFR BLD AUTO: 2 % (ref 1–4)
ERYTHROCYTE [DISTWIDTH] IN BLOOD BY AUTOMATED COUNT: 12.3 % (ref 11.5–14.5)
GLOBULIN SER-MCNC: 2.2 G/DL (ref 2–4)
GLUCOSE SERPL-MCNC: 128 MG/DL (ref 82–115)
HCT VFR BLD AUTO: 45.4 % (ref 40–54)
HDLC SERPL-MCNC: 48 MG/DL (ref 35–60)
HGB BLD-MCNC: 14.5 G/DL (ref 13.5–18)
IMM GRANULOCYTES # BLD AUTO: 0.02 K/UL (ref 0–0.04)
IMM GRANULOCYTES NFR BLD: 0.3 % (ref 0–0.4)
LDLC SERPL CALC-MCNC: 69 MG/DL
LDLC/HDLC SERPL: 1.4 {RATIO}
LYMPHOCYTES # BLD AUTO: 1.76 K/UL (ref 1–4.8)
LYMPHOCYTES NFR BLD AUTO: 29 % (ref 27–41)
MCH RBC QN AUTO: 29.4 PG (ref 27–31)
MCHC RBC AUTO-ENTMCNC: 31.9 G/DL (ref 32–36)
MCV RBC AUTO: 91.9 FL (ref 80–96)
MONOCYTES # BLD AUTO: 0.56 K/UL (ref 0–0.8)
MONOCYTES NFR BLD AUTO: 9.2 % (ref 2–6)
MPC BLD CALC-MCNC: 9.5 FL (ref 9.4–12.4)
NEUTROPHILS # BLD AUTO: 3.57 K/UL (ref 1.8–7.7)
NEUTROPHILS NFR BLD AUTO: 58.8 % (ref 53–65)
NONHDLC SERPL-MCNC: 110 MG/DL
NRBC # BLD AUTO: 0 X10E3/UL
NRBC, AUTO (.00): 0 %
PLATELET # BLD AUTO: 279 K/UL (ref 150–400)
POTASSIUM SERPL-SCNC: 3.9 MMOL/L (ref 3.5–5.1)
PROT SERPL-MCNC: 6.2 G/DL (ref 5.8–7.6)
RBC # BLD AUTO: 4.94 M/UL (ref 4.6–6.2)
SODIUM SERPL-SCNC: 139 MMOL/L (ref 136–145)
TRIGL SERPL-MCNC: 207 MG/DL (ref 34–140)
TSH SERPL DL<=0.005 MIU/L-ACNC: 1.73 UIU/ML (ref 0.35–4.94)
VLDLC SERPL-MCNC: 41 MG/DL
WBC # BLD AUTO: 6.07 K/UL (ref 4.5–11)

## 2025-03-25 PROCEDURE — 85025 COMPLETE CBC W/AUTO DIFF WBC: CPT | Mod: ,,, | Performed by: CLINICAL MEDICAL LABORATORY

## 2025-03-25 PROCEDURE — 99213 OFFICE O/P EST LOW 20 MIN: CPT | Mod: ,,, | Performed by: NURSE PRACTITIONER

## 2025-03-25 PROCEDURE — 80061 LIPID PANEL: CPT | Mod: ,,, | Performed by: CLINICAL MEDICAL LABORATORY

## 2025-03-25 PROCEDURE — 84443 ASSAY THYROID STIM HORMONE: CPT | Mod: ,,, | Performed by: CLINICAL MEDICAL LABORATORY

## 2025-03-25 PROCEDURE — 80053 COMPREHEN METABOLIC PANEL: CPT | Mod: ,,, | Performed by: CLINICAL MEDICAL LABORATORY

## 2025-03-25 NOTE — PROGRESS NOTES
Subjective     Patient ID: Shahzad Alarcon is a 69 y.o. male.    Chief Complaint: 6 Month fu for Essential Hypertension (TETANUS VACCINE Never done/Shingles Vaccine(1 of 2) Never done/Pneumococcal Vaccines (Age 50+)(1 of 2 - PCV) PT DECLINED/RSV Vaccine (Age 60+ and Pregnant patients)(1 - Risk 60-74 years 1-dose series) PT DECLINED/COVID-19 Vaccine(3 - Pfizer risk series) PT DECLINED/Colorectal Cancer Screening due on 02/28/2025  ORDER)    Pt presents for a hypertension follow-up.       Review of Systems   Constitutional:  Negative for activity change, appetite change, fatigue and fever.   HENT:  Negative for nasal congestion, nosebleeds, postnasal drip, rhinorrhea, sinus pressure/congestion, sneezing and sore throat.    Eyes:  Negative for pain and itching.   Respiratory:  Negative for cough, chest tightness, shortness of breath, wheezing and stridor.    Cardiovascular:  Negative for chest pain.   Gastrointestinal:  Negative for abdominal pain.   Genitourinary:  Negative for dysuria.   Musculoskeletal:  Negative for back pain.   Neurological:  Negative for dizziness and headaches.   Psychiatric/Behavioral:  Negative for behavioral problems and confusion.           Objective     Physical Exam  Vitals and nursing note reviewed.   Constitutional:       Appearance: Normal appearance.   Cardiovascular:      Rate and Rhythm: Normal rate and regular rhythm.      Heart sounds: Normal heart sounds.   Pulmonary:      Effort: Pulmonary effort is normal.      Breath sounds: Normal breath sounds.   Musculoskeletal:         General: Normal range of motion.   Neurological:      Mental Status: He is alert and oriented to person, place, and time.   Psychiatric:         Mood and Affect: Mood normal.         Behavior: Behavior normal.            Assessment and Plan     1. Essential hypertension  -     CBC Auto Differential; Future; Expected date: 03/25/2025  -     Comprehensive Metabolic Panel; Future; Expected date: 03/25/2025  -      Lipid Panel; Future; Expected date: 03/25/2025  -     TSH; Future; Expected date: 03/25/2025  Controlled at 135/83  Low sodium diet    2. Screening for malignant neoplasm of colon  -     Cologuard Screening (Multitarget Stool DNA); Future; Expected date: 03/25/2025        Will call pt with lab results.          Follow up in about 6 months (around 9/25/2025).

## 2025-04-14 ENCOUNTER — PATIENT MESSAGE (OUTPATIENT)
Dept: FAMILY MEDICINE | Facility: CLINIC | Age: 70
End: 2025-04-14
Payer: MEDICARE

## 2025-07-25 ENCOUNTER — TELEPHONE (OUTPATIENT)
Dept: UROLOGY | Facility: CLINIC | Age: 70
End: 2025-07-25
Payer: MEDICARE

## 2025-07-25 NOTE — TELEPHONE ENCOUNTER
----- Message from Valdez Berman NP sent at 7/25/2025 10:31 AM CDT -----  Please let patient know his PSA went up slightly to 7.034 and he will need a follow-up with me 1st available for further recommendations thanks  I called pt and spoke with him, relayed the above message to him, and told him his follow up appointment is  Tuesday 7/29/25 at 1:40pm.  He voiced understanding.  ----- Message -----  From: Lab, Background User  Sent: 7/24/2025   9:40 AM CDT  To: Valdez Berman NP

## 2025-07-28 ENCOUNTER — TELEPHONE (OUTPATIENT)
Dept: UROLOGY | Facility: CLINIC | Age: 70
End: 2025-07-28
Payer: MEDICARE

## 2025-07-28 NOTE — TELEPHONE ENCOUNTER
Copied from CRM #[id number]. Topic: Appointments - Appointment Rescheduling  >> Jul 28, 2025 11:30 AM Mariangel wrote:  Pts wife called. Pt is sick and can't come to appt tomorrow. Please let her know of resc appt at [phone].   Who Called: Wife for [facility]    Caller is requesting a sooner appointment.     When is the first available appointment?  Options offered (Virtual Visit, Urgent Care):   Symptoms:        Patient's Preferred Phone Number on File: [phone]   Best Call Back Number, if different:  Additional Information:  I called pt wife and informed her that her 's appointment is rescheduled to 8/18/25 at 2:40pm and we hope he feels better soon.  She voiced understanding.

## 2025-08-18 ENCOUNTER — OFFICE VISIT (OUTPATIENT)
Dept: UROLOGY | Facility: CLINIC | Age: 70
End: 2025-08-18
Payer: MEDICARE

## 2025-08-18 VITALS
OXYGEN SATURATION: 99 % | HEIGHT: 75 IN | HEART RATE: 67 BPM | BODY MASS INDEX: 28.97 KG/M2 | WEIGHT: 233 LBS | SYSTOLIC BLOOD PRESSURE: 168 MMHG | DIASTOLIC BLOOD PRESSURE: 95 MMHG

## 2025-08-18 DIAGNOSIS — R35.1 NOCTURIA: ICD-10-CM

## 2025-08-18 DIAGNOSIS — N32.0 BLADDER OUTLET OBSTRUCTION: Chronic | ICD-10-CM

## 2025-08-18 DIAGNOSIS — C61 PROSTATE CANCER: Primary | Chronic | ICD-10-CM

## 2025-08-18 DIAGNOSIS — R33.9 INCOMPLETE BLADDER EMPTYING: ICD-10-CM

## 2025-08-18 PROCEDURE — 99214 OFFICE O/P EST MOD 30 MIN: CPT | Mod: PBBFAC | Performed by: NURSE PRACTITIONER

## 2025-08-18 PROCEDURE — 99214 OFFICE O/P EST MOD 30 MIN: CPT | Mod: S$PBB,,, | Performed by: NURSE PRACTITIONER

## 2025-08-18 PROCEDURE — 99999 PR PBB SHADOW E&M-EST. PATIENT-LVL IV: CPT | Mod: PBBFAC,,, | Performed by: NURSE PRACTITIONER

## 2025-08-18 RX ORDER — CLINDAMYCIN HYDROCHLORIDE 300 MG/1
300 CAPSULE ORAL 3 TIMES DAILY
COMMUNITY
Start: 2025-06-09

## (undated) DEVICE — NDL MAXCORE BIOPSY 18G 25CM

## (undated) DEVICE — NEEDLE GUIDE ENDOCAVITY